# Patient Record
Sex: FEMALE | Race: WHITE | Employment: OTHER | URBAN - METROPOLITAN AREA
[De-identification: names, ages, dates, MRNs, and addresses within clinical notes are randomized per-mention and may not be internally consistent; named-entity substitution may affect disease eponyms.]

---

## 2017-02-02 ENCOUNTER — OFFICE VISIT (OUTPATIENT)
Dept: RHEUMATOLOGY | Facility: CLINIC | Age: 77
End: 2017-02-02

## 2017-02-02 VITALS
RESPIRATION RATE: 16 BRPM | HEART RATE: 60 BPM | BODY MASS INDEX: 52.63 KG/M2 | SYSTOLIC BLOOD PRESSURE: 128 MMHG | HEIGHT: 62 IN | DIASTOLIC BLOOD PRESSURE: 72 MMHG | WEIGHT: 286 LBS

## 2017-02-02 DIAGNOSIS — M05.79 SEROPOSITIVE RHEUMATOID ARTHRITIS OF MULTIPLE SITES (HCC): Primary | ICD-10-CM

## 2017-02-02 DIAGNOSIS — K21.9 GASTROESOPHAGEAL REFLUX DISEASE WITHOUT ESOPHAGITIS: ICD-10-CM

## 2017-02-02 PROCEDURE — 99213 OFFICE O/P EST LOW 20 MIN: CPT | Performed by: INTERNAL MEDICINE

## 2017-02-02 RX ORDER — HYDROCODONE BITARTRATE AND ACETAMINOPHEN 5; 325 MG/1; MG/1
2 TABLET ORAL EVERY 8 HOURS PRN
Qty: 180 TABLET | Refills: 0 | Status: SHIPPED | OUTPATIENT
Start: 2017-03-06 | End: 2017-02-02

## 2017-02-02 RX ORDER — HYDROCODONE BITARTRATE AND ACETAMINOPHEN 5; 325 MG/1; MG/1
2 TABLET ORAL EVERY 8 HOURS PRN
Qty: 180 TABLET | Refills: 0 | Status: SHIPPED | OUTPATIENT
Start: 2017-02-07 | End: 2017-02-02

## 2017-02-02 RX ORDER — HYDROCODONE BITARTRATE AND ACETAMINOPHEN 5; 325 MG/1; MG/1
2 TABLET ORAL EVERY 8 HOURS PRN
Qty: 180 TABLET | Refills: 0 | Status: SHIPPED | OUTPATIENT
Start: 2017-04-05 | End: 2017-05-05

## 2017-02-02 RX ORDER — MELOXICAM 15 MG/1
15 TABLET ORAL DAILY
Qty: 90 TABLET | Refills: 3 | Status: SHIPPED | OUTPATIENT
Start: 2017-02-02 | End: 2017-04-24

## 2017-02-02 RX ORDER — DIAZEPAM 5 MG/1
5 TABLET ORAL
Qty: 100 TABLET | Refills: 1 | Status: SHIPPED | OUTPATIENT
Start: 2017-02-02 | End: 2017-04-24

## 2017-02-02 NOTE — PATIENT INSTRUCTIONS
Current plan is to remain on methotrexate 5 tablets per week to treat the rheumatoid arthritis. Also continue folic acid 1 mg per day. Stop the etodolac. In its place take meloxicam 15 mg a day. For pain take Norco 2 tablets 3 times per day.   Try to wa

## 2017-02-02 NOTE — PROGRESS NOTES
[facility]  Dr. Marin Aparicio Progress Note     Subjective:   Cliff Taylor is a(n) 78year old female. Current complaints: Patient presents with:  Joint Pain: 3 month f/u. LABS 1-11-16 sed rate 36, RF-255.  New symptom-left 2 digits with trigger finger/ low-calorie diet and lose weight. Use diazepam 5 mg 1 every 6 hours for muscle cramps and stress. Also use Nexium daily for stomach upset. Return to see Dr. Mercy Espana in 3 months.        Alexandre Etienne MD 9/5/3652 5:23 PM

## 2017-04-12 ENCOUNTER — TELEPHONE (OUTPATIENT)
Dept: RHEUMATOLOGY | Facility: CLINIC | Age: 77
End: 2017-04-12

## 2017-04-12 NOTE — TELEPHONE ENCOUNTER
Pt stopped by office- does not have appt until 5/11/17.  Pt states 'was told by Dr. Marin Aparicio to  norco script because will out of the office the week is due norco refill at last office visit.' Pt's daughter called the office multiple times yesterday an

## 2017-04-24 ENCOUNTER — OFFICE VISIT (OUTPATIENT)
Dept: RHEUMATOLOGY | Facility: CLINIC | Age: 77
End: 2017-04-24

## 2017-04-24 VITALS — DIASTOLIC BLOOD PRESSURE: 78 MMHG | RESPIRATION RATE: 16 BRPM | SYSTOLIC BLOOD PRESSURE: 120 MMHG | HEART RATE: 76 BPM

## 2017-04-24 DIAGNOSIS — M15.9 PRIMARY OSTEOARTHRITIS INVOLVING MULTIPLE JOINTS: ICD-10-CM

## 2017-04-24 DIAGNOSIS — M05.79 SEROPOSITIVE RHEUMATOID ARTHRITIS OF MULTIPLE SITES (HCC): Primary | ICD-10-CM

## 2017-04-24 DIAGNOSIS — E66.01 MORBID OBESITY DUE TO EXCESS CALORIES (HCC): ICD-10-CM

## 2017-04-24 DIAGNOSIS — M47.816 PRIMARY OSTEOARTHRITIS OF LUMBAR SPINE: ICD-10-CM

## 2017-04-24 PROCEDURE — 99213 OFFICE O/P EST LOW 20 MIN: CPT | Performed by: INTERNAL MEDICINE

## 2017-04-24 RX ORDER — HYDROCODONE BITARTRATE AND ACETAMINOPHEN 5; 325 MG/1; MG/1
2 TABLET ORAL EVERY 8 HOURS PRN
COMMUNITY
End: 2017-04-24

## 2017-04-24 RX ORDER — HYDROCODONE BITARTRATE AND ACETAMINOPHEN 5; 325 MG/1; MG/1
TABLET ORAL
Qty: 180 TABLET | Refills: 0 | Status: SHIPPED | OUTPATIENT
Start: 2017-05-05 | End: 2017-10-23

## 2017-04-24 RX ORDER — DIAZEPAM 5 MG/1
5 TABLET ORAL
Qty: 100 TABLET | Refills: 2 | Status: SHIPPED | OUTPATIENT
Start: 2017-04-24 | End: 2017-07-24

## 2017-04-24 RX ORDER — HYDROCODONE BITARTRATE AND ACETAMINOPHEN 5; 325 MG/1; MG/1
TABLET ORAL
Qty: 180 TABLET | Refills: 0 | Status: SHIPPED | OUTPATIENT
Start: 2017-07-05 | End: 2017-10-23

## 2017-04-24 RX ORDER — HYDROXYCHLOROQUINE SULFATE 200 MG/1
200 TABLET, FILM COATED ORAL 2 TIMES DAILY
Qty: 180 TABLET | Refills: 1 | Status: SHIPPED | OUTPATIENT
Start: 2017-04-24 | End: 2017-07-24

## 2017-04-24 RX ORDER — MELOXICAM 15 MG/1
15 TABLET ORAL DAILY
Qty: 90 TABLET | Refills: 3 | Status: SHIPPED | OUTPATIENT
Start: 2017-04-24 | End: 2018-04-23 | Stop reason: ALTCHOICE

## 2017-04-24 RX ORDER — HYDROCODONE BITARTRATE AND ACETAMINOPHEN 5; 325 MG/1; MG/1
TABLET ORAL
Qty: 180 TABLET | Refills: 0 | Status: SHIPPED | OUTPATIENT
Start: 2017-06-05 | End: 2017-10-23

## 2017-04-24 RX ORDER — CEPHRADINE 500 MG
1 CAPSULE ORAL DAILY
COMMUNITY

## 2017-04-24 NOTE — PATIENT INSTRUCTIONS
Plan remain on methotrexate 5 tablets per week, along with folic acid 1 mg per day, along with meloxicam once a day, and Plaquenil 2 a day for treatment of rheumatoid arthritis. For pain take Norco 5 mg  , 2 tablets 3 times per day if needed.   For muscle

## 2017-04-24 NOTE — PROGRESS NOTES
[facility]  Dr. Bean Every Progress Note     Subjective:   Severa Curt is a(n) 78year old female. Current complaints: Patient presents with:  Rheumatoid Arthritis: LOV 2/2/17-here to get norco scripts-next script due 5/4/17.   Pt does not think casillas per day if needed. For muscle cramps take diazepam 5 mg 1 every 6-8 hours as needed. Use Nexium 1 a day for stomach upset. Take a vitamin D supplement because recent testing shows vitamin D is low at 16.8.   Return to see Dr. Jannie Phillips in 3 months with repe

## 2017-07-20 ENCOUNTER — TELEPHONE (OUTPATIENT)
Dept: RHEUMATOLOGY | Facility: CLINIC | Age: 77
End: 2017-07-20

## 2017-07-20 NOTE — TELEPHONE ENCOUNTER
Pt has appt 7/24/17- blood work has not been done. LMTCB.  mp       Future Appointments  Date Time Provider Savi Adames   0/20/4457 96:20 PM Geovanna Sims MD [facility] Laila Londono

## 2017-07-24 ENCOUNTER — OFFICE VISIT (OUTPATIENT)
Dept: RHEUMATOLOGY | Facility: CLINIC | Age: 77
End: 2017-07-24

## 2017-07-24 VITALS — RESPIRATION RATE: 20 BRPM | HEART RATE: 68 BPM | SYSTOLIC BLOOD PRESSURE: 136 MMHG | DIASTOLIC BLOOD PRESSURE: 86 MMHG

## 2017-07-24 DIAGNOSIS — M47.816 PRIMARY OSTEOARTHRITIS OF LUMBAR SPINE: ICD-10-CM

## 2017-07-24 DIAGNOSIS — M06.09 SERONEGATIVE RHEUMATOID ARTHRITIS OF MULTIPLE SITES (HCC): Primary | ICD-10-CM

## 2017-07-24 DIAGNOSIS — M15.9 PRIMARY OSTEOARTHRITIS INVOLVING MULTIPLE JOINTS: ICD-10-CM

## 2017-07-24 DIAGNOSIS — M79.604 PAIN IN RIGHT LEG: ICD-10-CM

## 2017-07-24 DIAGNOSIS — M05.79 SEROPOSITIVE RHEUMATOID ARTHRITIS OF MULTIPLE SITES (HCC): ICD-10-CM

## 2017-07-24 DIAGNOSIS — E66.09 CLASS 1 OBESITY DUE TO EXCESS CALORIES WITH SERIOUS COMORBIDITY IN ADULT, UNSPECIFIED BMI: ICD-10-CM

## 2017-07-24 PROCEDURE — 99213 OFFICE O/P EST LOW 20 MIN: CPT | Performed by: INTERNAL MEDICINE

## 2017-07-24 RX ORDER — HYDROCODONE BITARTRATE AND ACETAMINOPHEN 5; 325 MG/1; MG/1
2 TABLET ORAL EVERY 6 HOURS PRN
Qty: 240 TABLET | Refills: 0 | Status: SHIPPED | OUTPATIENT
Start: 2017-08-23 | End: 2017-09-22

## 2017-07-24 RX ORDER — FOLIC ACID 1 MG/1
1 TABLET ORAL DAILY
Qty: 90 TABLET | Refills: 5 | Status: SHIPPED | OUTPATIENT
Start: 2017-07-24 | End: 2019-01-21

## 2017-07-24 RX ORDER — HYDROCODONE BITARTRATE AND ACETAMINOPHEN 5; 325 MG/1; MG/1
TABLET ORAL
Qty: 240 TABLET | Refills: 0 | Status: SHIPPED | OUTPATIENT
Start: 2017-07-24 | End: 2018-01-23

## 2017-07-24 RX ORDER — DIAZEPAM 5 MG/1
5 TABLET ORAL
Qty: 100 TABLET | Refills: 2 | Status: SHIPPED | OUTPATIENT
Start: 2017-07-24 | End: 2017-10-23

## 2017-07-24 RX ORDER — HYDROCODONE BITARTRATE AND ACETAMINOPHEN 5; 325 MG/1; MG/1
2 TABLET ORAL EVERY 6 HOURS PRN
Qty: 240 TABLET | Refills: 0 | Status: SHIPPED | OUTPATIENT
Start: 2017-09-22 | End: 2017-10-22

## 2017-07-24 RX ORDER — FLUOXETINE HYDROCHLORIDE 20 MG/1
20 CAPSULE ORAL DAILY
Qty: 90 CAPSULE | Refills: 1 | Status: SHIPPED | OUTPATIENT
Start: 2017-07-24 | End: 2017-10-23

## 2017-07-24 NOTE — PROGRESS NOTES
[facility]  Dr. Aneesh Carr Progress Note     Subjective:   Manish Patterson is a(n) 78year old female. Current complaints: Patient presents with:  Osteoarthritis: 3 month f/u. Blood work not done. Pt states 'is confused about blood work.  Having lots of

## 2017-07-24 NOTE — TELEPHONE ENCOUNTER
Pt returned call.  Pt states 'doesn't remember that needs blood work done.' Pt to discuss with MD. Carole Munoz

## 2017-07-24 NOTE — PATIENT INSTRUCTIONS
Rheumatoid arthritis remain on methotrexate 5 tablets per week, along with folic acid 1 mg a day, also Plaquenil 2 tablets daily and meloxicam once a day. For pain take Norco 5 mg tablets 2 tablets up to 4 times a day. No more than 8 a day.

## 2017-10-23 ENCOUNTER — OFFICE VISIT (OUTPATIENT)
Dept: RHEUMATOLOGY | Facility: CLINIC | Age: 77
End: 2017-10-23

## 2017-10-23 VITALS
DIASTOLIC BLOOD PRESSURE: 74 MMHG | BODY MASS INDEX: 52 KG/M2 | SYSTOLIC BLOOD PRESSURE: 122 MMHG | RESPIRATION RATE: 24 BRPM | WEIGHT: 284 LBS | HEART RATE: 72 BPM

## 2017-10-23 DIAGNOSIS — M47.816 PRIMARY OSTEOARTHRITIS OF LUMBAR SPINE: ICD-10-CM

## 2017-10-23 DIAGNOSIS — M05.79 SEROPOSITIVE RHEUMATOID ARTHRITIS OF MULTIPLE SITES (HCC): Primary | ICD-10-CM

## 2017-10-23 DIAGNOSIS — K21.9 GASTROESOPHAGEAL REFLUX DISEASE WITHOUT ESOPHAGITIS: ICD-10-CM

## 2017-10-23 DIAGNOSIS — M15.9 PRIMARY OSTEOARTHRITIS INVOLVING MULTIPLE JOINTS: ICD-10-CM

## 2017-10-23 DIAGNOSIS — IMO0001 CLASS 3 OBESITY DUE TO EXCESS CALORIES WITHOUT SERIOUS COMORBIDITY WITH BODY MASS INDEX (BMI) OF 50.0 TO 59.9 IN ADULT: ICD-10-CM

## 2017-10-23 DIAGNOSIS — E55.9 VITAMIN D DEFICIENCY: ICD-10-CM

## 2017-10-23 PROCEDURE — 99213 OFFICE O/P EST LOW 20 MIN: CPT | Performed by: INTERNAL MEDICINE

## 2017-10-23 RX ORDER — HYDROCODONE BITARTRATE AND ACETAMINOPHEN 5; 325 MG/1; MG/1
TABLET ORAL
Qty: 180 TABLET | Refills: 0 | Status: SHIPPED | OUTPATIENT
Start: 2017-11-22 | End: 2018-01-23

## 2017-10-23 RX ORDER — RANITIDINE 150 MG/1
150 TABLET ORAL
COMMUNITY
Start: 2017-09-29 | End: 2019-04-19 | Stop reason: ALTCHOICE

## 2017-10-23 RX ORDER — HYDROCODONE BITARTRATE AND ACETAMINOPHEN 5; 325 MG/1; MG/1
TABLET ORAL
Qty: 180 TABLET | Refills: 0 | Status: SHIPPED | OUTPATIENT
Start: 2017-10-23 | End: 2018-01-23

## 2017-10-23 RX ORDER — FLUOXETINE HYDROCHLORIDE 20 MG/1
20 CAPSULE ORAL DAILY
Qty: 90 CAPSULE | Refills: 1 | Status: SHIPPED | OUTPATIENT
Start: 2017-10-23 | End: 2018-03-30

## 2017-10-23 RX ORDER — HYDROCODONE BITARTRATE AND ACETAMINOPHEN 5; 325 MG/1; MG/1
TABLET ORAL
Qty: 180 TABLET | Refills: 0 | Status: SHIPPED | OUTPATIENT
Start: 2017-12-22 | End: 2018-01-23

## 2017-10-23 RX ORDER — DIAZEPAM 5 MG/1
5 TABLET ORAL
Qty: 100 TABLET | Refills: 2 | Status: SHIPPED | OUTPATIENT
Start: 2017-10-23 | End: 2018-01-23

## 2017-10-23 RX ORDER — ERGOCALCIFEROL 1.25 MG/1
50000 CAPSULE ORAL WEEKLY
Qty: 12 CAPSULE | Refills: 3 | Status: SHIPPED | OUTPATIENT
Start: 2017-10-23 | End: 2018-07-20

## 2017-10-23 NOTE — PROGRESS NOTES
[facility]  Dr. Dangelo Abbott Progress Note     Subjective:   Stephanie Hernandez is a(n) 78year old female. Current complaints: Patient presents with:  Rheumatoid Arthritis: 3 month f/u. 9/16/17 labs ESR 35.  Pt states 'is feeling stressed out right now.'  Re

## 2017-10-23 NOTE — PATIENT INSTRUCTIONS
Continue Norco for pain 2 tablets three times per day 5 mg. Methotrexate 2.5 mg 5 per weeks. Use Folic acid 1 mg per day. Use Plaquenil 2 tablets per day. Instructed to lose weight. Return to office in 3 months.

## 2017-10-30 ENCOUNTER — TELEPHONE (OUTPATIENT)
Dept: RHEUMATOLOGY | Facility: CLINIC | Age: 77
End: 2017-10-30

## 2017-10-30 NOTE — TELEPHONE ENCOUNTER
Rec'd refill request from [place] for Nexium 40 mg cap, once daily, qty 30. Med sent.  mp     LOV 10/23/17  Last refill 10/3/17  Future Appointments  Date Time Provider Savi Adames   3/47/9885 4:05 PM Isaias Taveras MD [facility] Richmond Watt

## 2017-11-01 ENCOUNTER — TELEPHONE (OUTPATIENT)
Dept: RHEUMATOLOGY | Facility: CLINIC | Age: 77
End: 2017-11-01

## 2017-11-01 NOTE — TELEPHONE ENCOUNTER
[facility] SEAN CALLED. I SPOKE TO GIAN CHIU.  I STARTED PA FOR PT'S ETODOLAC   PA APPROVED #[id number]  INFORMED PHARMACY.   ANTHONY INFORMED PT IN [facility]

## 2017-11-01 NOTE — TELEPHONE ENCOUNTER
[facility] SEAN CALLED. I SPOKE TO GIAN CHIU.  I STARTED PA FOR PT'S NEXIUM FORMULARY EXCEPTION FOR BRAND NAME. PA APPROVED #[id number]  AWAITING APPROVAL LETTER TO BE FAXED. INFORMED PHARMACY.   ANTHONY INFORMED PT IN [facility]

## 2017-11-08 ENCOUNTER — TELEPHONE (OUTPATIENT)
Dept: RHEUMATOLOGY | Facility: CLINIC | Age: 77
End: 2017-11-08

## 2017-11-08 NOTE — TELEPHONE ENCOUNTER
Rec'd refill request from [place] for diclofenac gel. Med sent.  mp    Future Appointments  Date Time Provider Savi Adames   0/69/0122 2:76 PM Dante Freire MD [facility]

## 2018-01-09 RX ORDER — ETODOLAC 500 MG/1
TABLET, FILM COATED ORAL
Qty: 180 TABLET | Refills: 2 | OUTPATIENT
Start: 2018-01-09

## 2018-01-23 ENCOUNTER — OFFICE VISIT (OUTPATIENT)
Dept: RHEUMATOLOGY | Facility: CLINIC | Age: 78
End: 2018-01-23

## 2018-01-23 VITALS
HEART RATE: 80 BPM | WEIGHT: 280 LBS | SYSTOLIC BLOOD PRESSURE: 124 MMHG | RESPIRATION RATE: 24 BRPM | BODY MASS INDEX: 51 KG/M2 | DIASTOLIC BLOOD PRESSURE: 82 MMHG

## 2018-01-23 DIAGNOSIS — M15.9 PRIMARY OSTEOARTHRITIS INVOLVING MULTIPLE JOINTS: ICD-10-CM

## 2018-01-23 DIAGNOSIS — M47.816 PRIMARY OSTEOARTHRITIS OF LUMBAR SPINE: ICD-10-CM

## 2018-01-23 DIAGNOSIS — Z98.1 S/P LUMBAR FUSION: ICD-10-CM

## 2018-01-23 DIAGNOSIS — E66.01 CLASS 3 SEVERE OBESITY DUE TO EXCESS CALORIES WITH SERIOUS COMORBIDITY AND BODY MASS INDEX (BMI) OF 50.0 TO 59.9 IN ADULT (HCC): ICD-10-CM

## 2018-01-23 DIAGNOSIS — M05.79 SEROPOSITIVE RHEUMATOID ARTHRITIS OF MULTIPLE SITES (HCC): Primary | ICD-10-CM

## 2018-01-23 PROCEDURE — 99213 OFFICE O/P EST LOW 20 MIN: CPT | Performed by: INTERNAL MEDICINE

## 2018-01-23 RX ORDER — HYDROCODONE BITARTRATE AND ACETAMINOPHEN 5; 325 MG/1; MG/1
TABLET ORAL
Qty: 180 TABLET | Refills: 0 | Status: SHIPPED | OUTPATIENT
Start: 2018-01-23 | End: 2018-04-23

## 2018-01-23 RX ORDER — DIAZEPAM 5 MG/1
5 TABLET ORAL
Qty: 100 TABLET | Refills: 2 | Status: SHIPPED | OUTPATIENT
Start: 2018-01-23 | End: 2018-04-23

## 2018-01-23 RX ORDER — HYDROCODONE BITARTRATE AND ACETAMINOPHEN 5; 325 MG/1; MG/1
TABLET ORAL
Qty: 180 TABLET | Refills: 0 | Status: SHIPPED | OUTPATIENT
Start: 2018-03-24 | End: 2018-04-23

## 2018-01-23 RX ORDER — HYDROCODONE BITARTRATE AND ACETAMINOPHEN 5; 325 MG/1; MG/1
TABLET ORAL
Qty: 180 TABLET | Refills: 0 | Status: SHIPPED | OUTPATIENT
Start: 2018-02-23 | End: 2018-04-23

## 2018-01-23 NOTE — PROGRESS NOTES
[facility]  Dr. Brunilda Morse Progress Note     Subjective:   Yoly Thompson is a(n) 78year old female.    Current complaints: Patient presents with:  Osteoarthritis: 3 month f/u. 1/22/18 labs ESR 73. Pt states 'has stopped methotrexate because is on Eliqui

## 2018-01-23 NOTE — PATIENT INSTRUCTIONS
For chronic back pain use Norco 10 mg, 2 tablets 3 times a day. Use Diazepam 5 mg three times a day as a muscle relaxant. Recent episode of syncope. Off Methotrexate. Use Eliquis daily as per cardiology. Use your CPAP machine as ordered.   If back viridiana

## 2018-03-23 RX ORDER — DIAZEPAM 5 MG/1
TABLET ORAL
Qty: 100 TABLET | Refills: 1 | OUTPATIENT
Start: 2018-03-23

## 2018-03-23 RX ORDER — ESOMEPRAZOLE MAGNESIUM 40 MG/1
40 CAPSULE, DELAYED RELEASE ORAL
Qty: 30 CAPSULE | Refills: 1 | Status: SHIPPED | OUTPATIENT
Start: 2018-03-23 | End: 2018-05-22

## 2018-03-29 ENCOUNTER — TELEPHONE (OUTPATIENT)
Dept: RHEUMATOLOGY | Facility: CLINIC | Age: 78
End: 2018-03-29

## 2018-03-29 NOTE — TELEPHONE ENCOUNTER
Pt called requesting a refill for voltaren gel.  mp    Future Appointments  Date Time Provider Savi Adames   5/83/7163 5:47 PM Bel Lewis MD [facility]

## 2018-04-02 RX ORDER — FLUOXETINE HYDROCHLORIDE 20 MG/1
CAPSULE ORAL
Qty: 90 CAPSULE | Refills: 0 | Status: SHIPPED | OUTPATIENT
Start: 2018-04-02 | End: 2021-11-30

## 2018-04-02 RX ORDER — DIAZEPAM 5 MG/1
TABLET ORAL
Qty: 100 TABLET | Refills: 1 | OUTPATIENT
Start: 2018-04-02

## 2018-04-02 NOTE — TELEPHONE ENCOUNTER
Future Appointments  Date Time Provider Savi Adames   4/05/4684 2:57 PM Burak Marquez MD [facility] Luisito Mendez

## 2018-04-03 RX ORDER — DIAZEPAM 5 MG/1
TABLET ORAL
Qty: 100 TABLET | Refills: 1 | OUTPATIENT
Start: 2018-04-03

## 2018-04-23 ENCOUNTER — OFFICE VISIT (OUTPATIENT)
Dept: RHEUMATOLOGY | Facility: CLINIC | Age: 78
End: 2018-04-23

## 2018-04-23 VITALS — SYSTOLIC BLOOD PRESSURE: 136 MMHG | DIASTOLIC BLOOD PRESSURE: 86 MMHG | HEART RATE: 80 BPM | RESPIRATION RATE: 20 BRPM

## 2018-04-23 DIAGNOSIS — M19.011 ARTHRITIS OF RIGHT SHOULDER REGION: ICD-10-CM

## 2018-04-23 DIAGNOSIS — M05.79 SEROPOSITIVE RHEUMATOID ARTHRITIS OF MULTIPLE SITES (HCC): ICD-10-CM

## 2018-04-23 DIAGNOSIS — M47.816 PRIMARY OSTEOARTHRITIS OF LUMBAR SPINE: Primary | ICD-10-CM

## 2018-04-23 DIAGNOSIS — M25.511 TRIGGER POINT OF RIGHT SHOULDER REGION: ICD-10-CM

## 2018-04-23 DIAGNOSIS — M25.511 TRIGGER POINT OF SHOULDER REGION, RIGHT: ICD-10-CM

## 2018-04-23 DIAGNOSIS — M15.9 PRIMARY OSTEOARTHRITIS INVOLVING MULTIPLE JOINTS: ICD-10-CM

## 2018-04-23 DIAGNOSIS — E66.01 CLASS 3 SEVERE OBESITY DUE TO EXCESS CALORIES WITH SERIOUS COMORBIDITY AND BODY MASS INDEX (BMI) OF 50.0 TO 59.9 IN ADULT (HCC): ICD-10-CM

## 2018-04-23 DIAGNOSIS — Z98.1 S/P LUMBAR FUSION: ICD-10-CM

## 2018-04-23 PROCEDURE — 20552 NJX 1/MLT TRIGGER POINT 1/2: CPT | Performed by: INTERNAL MEDICINE

## 2018-04-23 PROCEDURE — 99213 OFFICE O/P EST LOW 20 MIN: CPT | Performed by: INTERNAL MEDICINE

## 2018-04-23 PROCEDURE — 20610 DRAIN/INJ JOINT/BURSA W/O US: CPT | Performed by: INTERNAL MEDICINE

## 2018-04-23 RX ORDER — HYDROCODONE BITARTRATE AND ACETAMINOPHEN 5; 325 MG/1; MG/1
TABLET ORAL
Qty: 180 TABLET | Refills: 0 | Status: SHIPPED | OUTPATIENT
Start: 2018-05-22 | End: 2018-07-20

## 2018-04-23 RX ORDER — GABAPENTIN 300 MG/1
300 CAPSULE ORAL 2 TIMES DAILY PRN
Qty: 60 CAPSULE | Refills: 5 | Status: SHIPPED | OUTPATIENT
Start: 2018-04-23 | End: 2019-01-21

## 2018-04-23 RX ORDER — GABAPENTIN 300 MG/1
300 CAPSULE ORAL 2 TIMES DAILY PRN
Qty: 60 CAPSULE | Refills: 5 | Status: SHIPPED | OUTPATIENT
Start: 2018-04-23 | End: 2018-04-23

## 2018-04-23 RX ORDER — HYDROCODONE BITARTRATE AND ACETAMINOPHEN 5; 325 MG/1; MG/1
TABLET ORAL
Qty: 180 TABLET | Refills: 0 | Status: SHIPPED | OUTPATIENT
Start: 2018-06-21 | End: 2018-07-20

## 2018-04-23 RX ORDER — METHYLPREDNISOLONE ACETATE 40 MG/ML
40 INJECTION, SUSPENSION INTRA-ARTICULAR; INTRALESIONAL; INTRAMUSCULAR; SOFT TISSUE ONCE
Status: COMPLETED | OUTPATIENT
Start: 2018-04-23 | End: 2018-04-23

## 2018-04-23 RX ORDER — HYDROCODONE BITARTRATE AND ACETAMINOPHEN 5; 325 MG/1; MG/1
TABLET ORAL
Qty: 180 TABLET | Refills: 0 | Status: SHIPPED | OUTPATIENT
Start: 2018-04-23 | End: 2018-07-20

## 2018-04-23 RX ORDER — DIAZEPAM 5 MG/1
5 TABLET ORAL
Qty: 100 TABLET | Refills: 2 | Status: SHIPPED | OUTPATIENT
Start: 2018-04-23 | End: 2018-07-20

## 2018-04-23 RX ADMIN — METHYLPREDNISOLONE ACETATE 40 MG: 40 INJECTION, SUSPENSION INTRA-ARTICULAR; INTRALESIONAL; INTRAMUSCULAR; SOFT TISSUE at 18:10:00

## 2018-04-23 NOTE — PATIENT INSTRUCTIONS
Today the right shoulder was injected with cortisone and some lidocaine go home and rest.  Put ice on the shoulder with biology. For pain take Norco 10 mg 2 tablets 3 times a day. For gabapentin take 300 mg twice a day.   Use diazepam 5 mg as needed for

## 2018-04-23 NOTE — PROGRESS NOTES
[facility]  Dr. Jodi Victor Progress Note     Subjective:   Curvin Bence is a(n) 78year old female. Current complaints: Patient presents with:  Rheumatoid Arthritis: 3 month f/u. Pt states 'has been in a lot of pain. Has been doing home PT.  Was diagn

## 2018-04-23 NOTE — PROCEDURES
After obtaining consent from the patient, the right shoulder was cleaned both in the front anteriorly and in the back. Betadine and alcohol wipes were used.   Then the right shoulder joint was anteriorly injected with 40 mg of methylprednisolone and 1 cc o

## 2018-04-24 RX ORDER — ETODOLAC 500 MG/1
TABLET, FILM COATED ORAL
Qty: 180 TABLET | Refills: 1 | OUTPATIENT
Start: 2018-04-24

## 2018-04-27 RX ORDER — ETODOLAC 500 MG/1
TABLET, FILM COATED ORAL
Qty: 180 TABLET | Refills: 2 | OUTPATIENT
Start: 2018-04-27

## 2018-04-30 ENCOUNTER — TELEPHONE (OUTPATIENT)
Dept: RHEUMATOLOGY | Facility: CLINIC | Age: 78
End: 2018-04-30

## 2018-04-30 NOTE — TELEPHONE ENCOUNTER
Daughter called, wants to know if mom can go on any other medication for RA-methotrexate and meloxicam stopped since pt is on Eliques - daughter stated that her RA is worse since off.   Informed daughter would inform Dr. Nick Maloney is out of town until [facility]

## 2018-05-07 NOTE — TELEPHONE ENCOUNTER
Michelet Gonsalez was called. Mrs. Luciano Mendoza recently has been placed on Eliquis a blood thinner. She was told by her cardiologist that she had a stop her arthritis medicine. Therefore she stopped both meloxicam and methotrexate.   I called her today and told her yes

## 2018-05-07 NOTE — TELEPHONE ENCOUNTER
Spoke with pt. Pt would like clarification on medications. Pt states 'stopped methotrexate in January because is on Eliquis. Can not take methotrexate, meloxicam or etodolac.  Would like to know if can take anything else?'  Pt would like daughter to be call

## 2018-05-22 ENCOUNTER — TELEPHONE (OUTPATIENT)
Dept: RHEUMATOLOGY | Facility: CLINIC | Age: 78
End: 2018-05-22

## 2018-05-22 RX ORDER — ESOMEPRAZOLE MAGNESIUM 40 MG/1
40 CAPSULE, DELAYED RELEASE ORAL
Qty: 30 CAPSULE | Refills: 1 | Status: SHIPPED | OUTPATIENT
Start: 2018-05-22 | End: 2018-07-20

## 2018-05-22 NOTE — TELEPHONE ENCOUNTER
Rec'd refill request from [place] for nexium. Med sent.  mp    Future Appointments  Date Time Provider Savi Littlejohni   8/09/6993 7:17 PM Faisal Sanchez MD [facility] GILBERTO Winkler

## 2018-06-12 ENCOUNTER — TELEPHONE (OUTPATIENT)
Dept: RHEUMATOLOGY | Facility: CLINIC | Age: 78
End: 2018-06-12

## 2018-07-20 ENCOUNTER — OFFICE VISIT (OUTPATIENT)
Dept: RHEUMATOLOGY | Facility: CLINIC | Age: 78
End: 2018-07-20
Payer: MEDICARE

## 2018-07-20 VITALS
HEART RATE: 80 BPM | WEIGHT: 263 LBS | HEIGHT: 62 IN | RESPIRATION RATE: 20 BRPM | SYSTOLIC BLOOD PRESSURE: 130 MMHG | DIASTOLIC BLOOD PRESSURE: 80 MMHG | TEMPERATURE: 98 F | BODY MASS INDEX: 48.4 KG/M2

## 2018-07-20 DIAGNOSIS — E66.01 CLASS 3 SEVERE OBESITY DUE TO EXCESS CALORIES WITH SERIOUS COMORBIDITY AND BODY MASS INDEX (BMI) OF 50.0 TO 59.9 IN ADULT (HCC): ICD-10-CM

## 2018-07-20 DIAGNOSIS — M15.9 PRIMARY OSTEOARTHRITIS INVOLVING MULTIPLE JOINTS: ICD-10-CM

## 2018-07-20 DIAGNOSIS — M47.816 PRIMARY OSTEOARTHRITIS OF LUMBAR SPINE: ICD-10-CM

## 2018-07-20 DIAGNOSIS — M05.79 SEROPOSITIVE RHEUMATOID ARTHRITIS OF MULTIPLE SITES (HCC): Primary | ICD-10-CM

## 2018-07-20 DIAGNOSIS — Z98.1 S/P LUMBAR FUSION: ICD-10-CM

## 2018-07-20 PROCEDURE — 99213 OFFICE O/P EST LOW 20 MIN: CPT | Performed by: INTERNAL MEDICINE

## 2018-07-20 RX ORDER — ESOMEPRAZOLE MAGNESIUM 40 MG/1
40 CAPSULE, DELAYED RELEASE ORAL
Qty: 30 CAPSULE | Refills: 1 | Status: SHIPPED | OUTPATIENT
Start: 2018-07-20 | End: 2019-01-21

## 2018-07-20 RX ORDER — HYDROCODONE BITATRATE AND ACETAMINOPHEN 5; 325 MG/1; MG/1
TABLET ORAL
Qty: 180 TABLET | Refills: 0 | Status: SHIPPED | OUTPATIENT
Start: 2018-07-20 | End: 2018-07-20

## 2018-07-20 RX ORDER — HYDROCODONE BITATRATE AND ACETAMINOPHEN 5; 325 MG/1; MG/1
TABLET ORAL
Qty: 180 TABLET | Refills: 0 | Status: SHIPPED | OUTPATIENT
Start: 2018-07-20 | End: 2019-01-17

## 2018-07-20 RX ORDER — HYDROCODONE BITATRATE AND ACETAMINOPHEN 5; 325 MG/1; MG/1
TABLET ORAL
Qty: 180 TABLET | Refills: 0 | Status: SHIPPED | OUTPATIENT
Start: 2018-08-19 | End: 2018-07-20

## 2018-07-20 RX ORDER — ERGOCALCIFEROL 1.25 MG/1
50000 CAPSULE ORAL WEEKLY
Qty: 12 CAPSULE | Refills: 3 | Status: SHIPPED | OUTPATIENT
Start: 2018-07-20 | End: 2020-01-17

## 2018-07-20 RX ORDER — DIAZEPAM 5 MG/1
5 TABLET ORAL
Qty: 100 TABLET | Refills: 2 | Status: SHIPPED | OUTPATIENT
Start: 2018-07-20 | End: 2018-10-17

## 2018-07-20 RX ORDER — HYDROCODONE BITATRATE AND ACETAMINOPHEN 5; 325 MG/1; MG/1
TABLET ORAL
Qty: 180 TABLET | Refills: 0 | Status: SHIPPED | OUTPATIENT
Start: 2018-09-20 | End: 2018-07-20 | Stop reason: CLARIF

## 2018-07-20 RX ORDER — HYDROCODONE BITATRATE AND ACETAMINOPHEN 5; 325 MG/1; MG/1
TABLET ORAL
Qty: 180 TABLET | Refills: 0 | Status: SHIPPED | OUTPATIENT
Start: 2018-09-18 | End: 2019-07-01 | Stop reason: ALTCHOICE

## 2018-07-20 RX ORDER — HYDROCODONE BITATRATE AND ACETAMINOPHEN 5; 325 MG/1; MG/1
TABLET ORAL
Qty: 180 TABLET | Refills: 0 | Status: SHIPPED | OUTPATIENT
Start: 2018-08-20 | End: 2018-07-20 | Stop reason: CLARIF

## 2018-07-20 NOTE — PATIENT INSTRUCTIONS
At present time use Norco 5 mg 2 tablets 3 times a day brand name. Insurance might not pay for brand name Ashanti Agustin, and you might have to use generic hydrocodone. Take diazepam 5 mg tablets as a muscle relaxer 3 times a day.   Discontinue methotrexate due to

## 2018-10-02 ENCOUNTER — TELEPHONE (OUTPATIENT)
Dept: RHEUMATOLOGY | Facility: CLINIC | Age: 78
End: 2018-10-02

## 2018-10-02 DIAGNOSIS — M06.9 RHEUMATOID ARTHRITIS, INVOLVING UNSPECIFIED SITE, UNSPECIFIED RHEUMATOID FACTOR PRESENCE: Primary | ICD-10-CM

## 2018-10-17 ENCOUNTER — OFFICE VISIT (OUTPATIENT)
Dept: RHEUMATOLOGY | Facility: CLINIC | Age: 78
End: 2018-10-17
Payer: MEDICARE

## 2018-10-17 VITALS
DIASTOLIC BLOOD PRESSURE: 70 MMHG | WEIGHT: 274 LBS | BODY MASS INDEX: 49.16 KG/M2 | HEIGHT: 62.5 IN | SYSTOLIC BLOOD PRESSURE: 118 MMHG

## 2018-10-17 DIAGNOSIS — M15.9 PRIMARY OSTEOARTHRITIS INVOLVING MULTIPLE JOINTS: ICD-10-CM

## 2018-10-17 DIAGNOSIS — M79.7 FIBROMYALGIA: ICD-10-CM

## 2018-10-17 DIAGNOSIS — Z98.1 S/P LUMBAR FUSION: ICD-10-CM

## 2018-10-17 DIAGNOSIS — M05.79 SEROPOSITIVE RHEUMATOID ARTHRITIS OF MULTIPLE SITES (HCC): Primary | ICD-10-CM

## 2018-10-17 DIAGNOSIS — M47.816 PRIMARY OSTEOARTHRITIS OF LUMBAR SPINE: ICD-10-CM

## 2018-10-17 PROCEDURE — 99213 OFFICE O/P EST LOW 20 MIN: CPT | Performed by: INTERNAL MEDICINE

## 2018-10-17 RX ORDER — HYDROCODONE BITARTRATE AND ACETAMINOPHEN 5; 325 MG/1; MG/1
2 TABLET ORAL 3 TIMES DAILY
Qty: 180 TABLET | Refills: 0 | Status: SHIPPED | OUTPATIENT
Start: 2018-12-16 | End: 2019-01-15

## 2018-10-17 RX ORDER — HYDROCODONE BITARTRATE AND ACETAMINOPHEN 5; 325 MG/1; MG/1
2 TABLET ORAL 3 TIMES DAILY
Qty: 180 TABLET | Refills: 0 | Status: SHIPPED | OUTPATIENT
Start: 2018-11-16 | End: 2018-12-16

## 2018-10-17 RX ORDER — DIAZEPAM 5 MG/1
5 TABLET ORAL
Qty: 100 TABLET | Refills: 2 | Status: SHIPPED | OUTPATIENT
Start: 2018-10-17 | End: 2019-01-17

## 2018-10-17 RX ORDER — HYDROCODONE BITARTRATE AND ACETAMINOPHEN 5; 325 MG/1; MG/1
2 TABLET ORAL 3 TIMES DAILY
Qty: 180 TABLET | Refills: 0 | Status: SHIPPED | OUTPATIENT
Start: 2018-10-17 | End: 2018-11-16

## 2018-10-17 RX ORDER — FOLIC ACID 1 MG/1
1 TABLET ORAL DAILY
Qty: 30 TABLET | Refills: 5 | Status: SHIPPED | OUTPATIENT
Start: 2018-10-17 | End: 2019-04-19

## 2018-10-17 RX ORDER — FUROSEMIDE 20 MG/1
40 TABLET ORAL DAILY
COMMUNITY
Start: 2018-07-17 | End: 2021-11-30 | Stop reason: ALTCHOICE

## 2018-10-17 NOTE — PROGRESS NOTES
[facility]  Dr. Calvin Knutson Progress Note     Subjective:   Daniella Urban is a(n) 78year old female.    Current complaints: Patient presents with:  Rheumatoid Arthritis: 3 month f/u, Lab work done at [facility] 10/2/2018, Sed Rate 44, (In Care Everywhere-callin Aleve.  You can use over-the-counter extra strength Tylenol 2 tablets twice a day also. Also apply Voltaren gel 4 times a day to the sore joints. Continue fluoxetine once a day. Return to see Dr. Jannie Phillips in 3 months with recheck of labs.           Quiana Abbott

## 2018-10-17 NOTE — PATIENT INSTRUCTIONS
Current plan is to restart methotrexate at 5 tablets/week to treat the rheumatoid arthritis. When taking methotrexate also take folic acid 1 mg daily.   For the muscle pain of fibromyalgia take diazepam also known as Valium 5 mg tablets 1 or 2 at a time 3

## 2018-10-18 ENCOUNTER — TELEPHONE (OUTPATIENT)
Dept: RHEUMATOLOGY | Facility: CLINIC | Age: 78
End: 2018-10-18

## 2018-10-18 NOTE — TELEPHONE ENCOUNTER
PA for hydrocodone 5mg done on CMM. Approved for non-formulary from 7/20/18-10-18-19. Pharmacy notified.

## 2019-01-17 RX ORDER — DIAZEPAM 5 MG/1
5 TABLET ORAL
Qty: 24 TABLET | Refills: 0 | Status: SHIPPED | OUTPATIENT
Start: 2019-01-17 | End: 2019-01-18

## 2019-01-17 RX ORDER — HYDROCODONE BITATRATE AND ACETAMINOPHEN 5; 325 MG/1; MG/1
2 TABLET ORAL EVERY 8 HOURS PRN
Qty: 24 TABLET | Refills: 0 | Status: SHIPPED | OUTPATIENT
Start: 2019-01-17 | End: 2019-01-18

## 2019-01-17 NOTE — TELEPHONE ENCOUNTER
Pt missed appt on 1/15/19, out of pain medication.  LOV 10/17/18  Future Appointments   Date Time Provider Savi Adaems   2/03/8599  2:40 PM Anabella Shannon MD [facility] Chan Pierson

## 2019-01-18 RX ORDER — HYDROCODONE BITATRATE AND ACETAMINOPHEN 5; 325 MG/1; MG/1
2 TABLET ORAL EVERY 8 HOURS PRN
Qty: 24 TABLET | Refills: 0 | Status: SHIPPED | OUTPATIENT
Start: 2019-01-18 | End: 2019-01-22

## 2019-01-18 RX ORDER — DIAZEPAM 5 MG/1
5 TABLET ORAL
Qty: 24 TABLET | Refills: 0 | Status: SHIPPED | OUTPATIENT
Start: 2019-01-18 | End: 2019-01-21

## 2019-01-21 ENCOUNTER — LAB ENCOUNTER (OUTPATIENT)
Dept: LAB | Age: 79
End: 2019-01-21
Attending: INTERNAL MEDICINE
Payer: MEDICARE

## 2019-01-21 ENCOUNTER — OFFICE VISIT (OUTPATIENT)
Dept: RHEUMATOLOGY | Facility: CLINIC | Age: 79
End: 2019-01-21
Payer: MEDICARE

## 2019-01-21 ENCOUNTER — TELEPHONE (OUTPATIENT)
Dept: RHEUMATOLOGY | Facility: CLINIC | Age: 79
End: 2019-01-21

## 2019-01-21 VITALS
SYSTOLIC BLOOD PRESSURE: 112 MMHG | HEIGHT: 62.5 IN | RESPIRATION RATE: 18 BRPM | WEIGHT: 274 LBS | HEART RATE: 76 BPM | BODY MASS INDEX: 49.16 KG/M2 | DIASTOLIC BLOOD PRESSURE: 70 MMHG

## 2019-01-21 DIAGNOSIS — M79.7 FIBROMYALGIA: ICD-10-CM

## 2019-01-21 DIAGNOSIS — M15.9 PRIMARY OSTEOARTHRITIS INVOLVING MULTIPLE JOINTS: ICD-10-CM

## 2019-01-21 DIAGNOSIS — Z98.1 S/P LUMBAR FUSION: ICD-10-CM

## 2019-01-21 DIAGNOSIS — M47.816 PRIMARY OSTEOARTHRITIS OF LUMBAR SPINE: ICD-10-CM

## 2019-01-21 DIAGNOSIS — E66.01 CLASS 3 SEVERE OBESITY DUE TO EXCESS CALORIES WITHOUT SERIOUS COMORBIDITY WITH BODY MASS INDEX (BMI) OF 60.0 TO 69.9 IN ADULT (HCC): ICD-10-CM

## 2019-01-21 DIAGNOSIS — M05.79 SEROPOSITIVE RHEUMATOID ARTHRITIS OF MULTIPLE SITES (HCC): ICD-10-CM

## 2019-01-21 DIAGNOSIS — M05.79 SEROPOSITIVE RHEUMATOID ARTHRITIS OF MULTIPLE SITES (HCC): Primary | ICD-10-CM

## 2019-01-21 LAB
ALBUMIN SERPL-MCNC: 3.8 G/DL (ref 3.1–4.5)
ALBUMIN/GLOB SERPL: 1 {RATIO} (ref 1–2)
ALP LIVER SERPL-CCNC: 97 U/L (ref 55–142)
ALT SERPL-CCNC: 10 U/L (ref 14–54)
ANION GAP SERPL CALC-SCNC: 6 MMOL/L (ref 0–18)
AST SERPL-CCNC: 16 U/L (ref 15–41)
BASOPHILS # BLD AUTO: 0.03 X10(3) UL (ref 0–0.1)
BASOPHILS NFR BLD AUTO: 0.7 %
BILIRUB SERPL-MCNC: 0.8 MG/DL (ref 0.1–2)
BUN BLD-MCNC: 15 MG/DL (ref 8–20)
BUN/CREAT SERPL: 17.9 (ref 10–20)
CALCIUM BLD-MCNC: 9 MG/DL (ref 8.3–10.3)
CHLORIDE SERPL-SCNC: 106 MMOL/L (ref 101–111)
CO2 SERPL-SCNC: 28 MMOL/L (ref 22–32)
CREAT BLD-MCNC: 0.84 MG/DL (ref 0.55–1.02)
EOSINOPHIL # BLD AUTO: 0.18 X10(3) UL (ref 0–0.3)
EOSINOPHIL NFR BLD AUTO: 4.1 %
ERYTHROCYTE [DISTWIDTH] IN BLOOD BY AUTOMATED COUNT: 17.5 % (ref 11.5–16)
GLOBULIN PLAS-MCNC: 4 G/DL (ref 2.8–4.4)
GLUCOSE BLD-MCNC: 101 MG/DL (ref 70–99)
HCT VFR BLD AUTO: 34.7 % (ref 34–50)
HGB BLD-MCNC: 10.5 G/DL (ref 12–16)
IMMATURE GRANULOCYTE COUNT: 0.01 X10(3) UL (ref 0–1)
IMMATURE GRANULOCYTE RATIO %: 0.2 %
LYMPHOCYTES # BLD AUTO: 1.08 X10(3) UL (ref 0.9–4)
LYMPHOCYTES NFR BLD AUTO: 24.4 %
M PROTEIN MFR SERPL ELPH: 7.8 G/DL (ref 6.4–8.2)
MCH RBC QN AUTO: 28.5 PG (ref 27–33.2)
MCHC RBC AUTO-ENTMCNC: 30.3 G/DL (ref 31–37)
MCV RBC AUTO: 94.3 FL (ref 81–100)
MONOCYTES # BLD AUTO: 0.38 X10(3) UL (ref 0.1–1)
MONOCYTES NFR BLD AUTO: 8.6 %
NEUTROPHIL ABS PRELIM: 2.74 X10 (3) UL (ref 1.3–6.7)
NEUTROPHILS # BLD AUTO: 2.74 X10(3) UL (ref 1.3–6.7)
NEUTROPHILS NFR BLD AUTO: 62 %
OSMOLALITY SERPL CALC.SUM OF ELEC: 291 MOSM/KG (ref 275–295)
PLATELET # BLD AUTO: 232 10(3)UL (ref 150–450)
POTASSIUM SERPL-SCNC: 4.3 MMOL/L (ref 3.6–5.1)
RBC # BLD AUTO: 3.68 X10(6)UL (ref 3.8–5.1)
RED CELL DISTRIBUTION WIDTH-SD: 58.6 FL (ref 35.1–46.3)
RHEUMATOID FACT SERPL-ACNC: 185 IU/ML (ref ?–15)
SED RATE-ML: 44 MM/HR (ref 0–25)
SODIUM SERPL-SCNC: 140 MMOL/L (ref 136–144)
WBC # BLD AUTO: 4.4 X10(3) UL (ref 4–13)

## 2019-01-21 PROCEDURE — 80053 COMPREHEN METABOLIC PANEL: CPT

## 2019-01-21 PROCEDURE — 86431 RHEUMATOID FACTOR QUANT: CPT

## 2019-01-21 PROCEDURE — 99214 OFFICE O/P EST MOD 30 MIN: CPT | Performed by: INTERNAL MEDICINE

## 2019-01-21 PROCEDURE — 85652 RBC SED RATE AUTOMATED: CPT

## 2019-01-21 PROCEDURE — 85025 COMPLETE CBC W/AUTO DIFF WBC: CPT

## 2019-01-21 RX ORDER — GABAPENTIN 300 MG/1
300 CAPSULE ORAL 2 TIMES DAILY
Qty: 60 CAPSULE | Refills: 5 | Status: SHIPPED | OUTPATIENT
Start: 2019-01-21 | End: 2019-10-17

## 2019-01-21 RX ORDER — HYDROCODONE BITARTRATE AND ACETAMINOPHEN 5; 325 MG/1; MG/1
2 TABLET ORAL EVERY 8 HOURS PRN
Qty: 180 TABLET | Refills: 0 | Status: SHIPPED | OUTPATIENT
Start: 2019-03-19 | End: 2019-04-18

## 2019-01-21 RX ORDER — FOLIC ACID 1 MG/1
1 TABLET ORAL DAILY
Qty: 90 TABLET | Refills: 5 | Status: SHIPPED | OUTPATIENT
Start: 2019-01-21 | End: 2019-07-01

## 2019-01-21 RX ORDER — ESOMEPRAZOLE MAGNESIUM 40 MG/1
40 CAPSULE, DELAYED RELEASE ORAL
Qty: 30 CAPSULE | Refills: 2 | Status: SHIPPED | OUTPATIENT
Start: 2019-01-21 | End: 2019-04-19 | Stop reason: ALTCHOICE

## 2019-01-21 RX ORDER — HYDROCODONE BITARTRATE AND ACETAMINOPHEN 5; 325 MG/1; MG/1
2 TABLET ORAL EVERY 8 HOURS PRN
Qty: 180 TABLET | Refills: 0 | Status: SHIPPED | OUTPATIENT
Start: 2019-01-21 | End: 2019-02-20

## 2019-01-21 RX ORDER — DIAZEPAM 5 MG/1
5 TABLET ORAL
Qty: 100 TABLET | Refills: 1 | Status: SHIPPED | OUTPATIENT
Start: 2019-01-21 | End: 2019-02-20

## 2019-01-21 RX ORDER — HYDROCODONE BITARTRATE AND ACETAMINOPHEN 5; 325 MG/1; MG/1
2 TABLET ORAL EVERY 8 HOURS PRN
Qty: 180 TABLET | Refills: 0 | Status: SHIPPED | OUTPATIENT
Start: 2019-02-20 | End: 2019-03-22

## 2019-01-21 NOTE — PATIENT INSTRUCTIONS
Use Norco 5 mg 2 tablets 3 times a day for pain. Use Gabapentin 300 mg twice a day for Fibromyalgia/muscle pain. .  Use Methotrexate 5 tablets per week. Use Folic acid daily. Use Diazepam 5 mg for cramps or anxiety as needed.   Use Voltaren gel daily 3-4

## 2019-01-21 NOTE — PROGRESS NOTES
[facility]  Dr. Ortiz [place] Progress Note     Subjective:   Rylie Maldonado is a(n) 70year old female. Current complaints: Patient presents with:  Rheumatoid Arthritis: 3 month f/u. Pt needs labs drawn. Rapid 3-high severity.  Pt states she has \"pain al

## 2019-01-22 NOTE — TELEPHONE ENCOUNTER
Patient was called. Lab results discussed. Sed rate 44. Rheumatoid factor I04. Increase methotrexate 6 tablets/week.   Dr. Giordano Hamper

## 2019-03-14 ENCOUNTER — TELEPHONE (OUTPATIENT)
Dept: RHEUMATOLOGY | Facility: CLINIC | Age: 79
End: 2019-03-14

## 2019-03-14 NOTE — TELEPHONE ENCOUNTER
LVM for patient to call the office to clarify if she requires the brand drug Nexium before a prior authorization is initiated. Our office did write an order for the generic form of Nexium,omeperazole 1/21/2019.

## 2019-04-19 ENCOUNTER — OFFICE VISIT (OUTPATIENT)
Dept: RHEUMATOLOGY | Facility: CLINIC | Age: 79
End: 2019-04-19
Payer: MEDICARE

## 2019-04-19 VITALS
BODY MASS INDEX: 49.16 KG/M2 | DIASTOLIC BLOOD PRESSURE: 68 MMHG | RESPIRATION RATE: 20 BRPM | HEIGHT: 62.5 IN | HEART RATE: 80 BPM | WEIGHT: 274 LBS | SYSTOLIC BLOOD PRESSURE: 110 MMHG

## 2019-04-19 DIAGNOSIS — M15.9 PRIMARY OSTEOARTHRITIS INVOLVING MULTIPLE JOINTS: ICD-10-CM

## 2019-04-19 DIAGNOSIS — D63.8 ANEMIA OF CHRONIC DISEASE: ICD-10-CM

## 2019-04-19 DIAGNOSIS — M47.816 PRIMARY OSTEOARTHRITIS OF LUMBAR SPINE: ICD-10-CM

## 2019-04-19 DIAGNOSIS — E66.01 CLASS 3 SEVERE OBESITY DUE TO EXCESS CALORIES WITHOUT SERIOUS COMORBIDITY WITH BODY MASS INDEX (BMI) OF 60.0 TO 69.9 IN ADULT (HCC): ICD-10-CM

## 2019-04-19 DIAGNOSIS — M05.79 SEROPOSITIVE RHEUMATOID ARTHRITIS OF MULTIPLE SITES (HCC): Primary | ICD-10-CM

## 2019-04-19 DIAGNOSIS — K21.9 GASTROESOPHAGEAL REFLUX DISEASE WITHOUT ESOPHAGITIS: ICD-10-CM

## 2019-04-19 DIAGNOSIS — M79.7 FIBROMYALGIA: ICD-10-CM

## 2019-04-19 DIAGNOSIS — Z98.1 S/P LUMBAR FUSION: ICD-10-CM

## 2019-04-19 PROCEDURE — 99214 OFFICE O/P EST MOD 30 MIN: CPT | Performed by: INTERNAL MEDICINE

## 2019-04-19 RX ORDER — HYDROCODONE BITARTRATE AND ACETAMINOPHEN 5; 325 MG/1; MG/1
2 TABLET ORAL 3 TIMES DAILY PRN
Qty: 180 TABLET | Refills: 0 | Status: SHIPPED | OUTPATIENT
Start: 2019-06-17 | End: 2019-04-23 | Stop reason: RX

## 2019-04-19 RX ORDER — ESOMEPRAZOLE MAGNESIUM 40 MG/1
40 CAPSULE, DELAYED RELEASE ORAL
Qty: 30 CAPSULE | Refills: 2 | Status: CANCELLED | OUTPATIENT
Start: 2019-04-19

## 2019-04-19 RX ORDER — HYDROCODONE BITARTRATE AND ACETAMINOPHEN 5; 325 MG/1; MG/1
2 TABLET ORAL 2 TIMES DAILY PRN
Qty: 180 TABLET | Refills: 0 | Status: SHIPPED | OUTPATIENT
Start: 2019-05-18 | End: 2019-04-23 | Stop reason: RX

## 2019-04-19 RX ORDER — GABAPENTIN 300 MG/1
300 CAPSULE ORAL 2 TIMES DAILY
Qty: 60 CAPSULE | Refills: 0 | Status: CANCELLED | OUTPATIENT
Start: 2019-04-19

## 2019-04-19 RX ORDER — OMEPRAZOLE 40 MG/1
40 CAPSULE, DELAYED RELEASE ORAL 2 TIMES DAILY
Qty: 60 CAPSULE | Refills: 3 | Status: SHIPPED | OUTPATIENT
Start: 2019-04-19 | End: 2019-07-01

## 2019-04-19 RX ORDER — HYDROCODONE BITARTRATE AND ACETAMINOPHEN 5; 325 MG/1; MG/1
2 TABLET ORAL 3 TIMES DAILY
Qty: 180 TABLET | Refills: 0 | Status: SHIPPED | OUTPATIENT
Start: 2019-04-19 | End: 2019-04-23 | Stop reason: RX

## 2019-04-19 NOTE — PATIENT INSTRUCTIONS
Current plan is to increase methotrexate from 5 tablets/week to 6 tablets/week to help with the rheumatoid arthritis. Continue to take the vitamin folic acid daily with the methotrexate. Folic acid helps prevent side effects from the methotrexate.   For s

## 2019-04-19 NOTE — PROGRESS NOTES
[facility]  Dr. Waldrop Exon Progress Note     Subjective:   Marva Camarillo is a(n) 70year old female.    Current complaints: Patient presents with:  Rheumatoid Arthritis: 3 month f/u, no recent labs, Rapid 3 high severity, reports poor relief from generic of 6 Norco per day maximum. Use gabapentin 300 mg once or twice a day for additional pain relief as needed. Apply Voltaren gel 3-4 times to sore joints. Discontinue Eliquis. Have your gastroscopy done by Alvaro Rowe later this upcoming week.   Try to lose

## 2019-04-22 ENCOUNTER — TELEPHONE (OUTPATIENT)
Dept: RHEUMATOLOGY | Facility: CLINIC | Age: 79
End: 2019-04-22

## 2019-04-22 NOTE — TELEPHONE ENCOUNTER
[address] whom stated pt does get Brand only of hydrocodone. Per Dr. Red Whitlock: patient  could try Percocet 5 mg 3 times per day in place of the [address].  She has to bring back to [address] [facility] prescriptions.  Dr. Red Whitlock.  Daughter informed and will talk to renita

## 2019-04-22 NOTE — TELEPHONE ENCOUNTER
No pharmacy can find namebrand Norco, pt does not want to take generic. Pt cannot take NSAIDS-on Eliques. Daughter wants to find something else for pt's pain.

## 2019-04-23 RX ORDER — TRAMADOL HYDROCHLORIDE 50 MG/1
TABLET ORAL 3 TIMES DAILY
Qty: 180 TABLET | Refills: 2 | Status: SHIPPED | OUTPATIENT
Start: 2019-04-23 | End: 2019-07-01 | Stop reason: ALTCHOICE

## 2019-04-23 NOTE — TELEPHONE ENCOUNTER
Daughter brought in 3 norco scripts dated 4/19/19, 5/18/19, and 6/17/19-shredded due to fact pt could not get Brand hydrocodone. Pt wants to switch to tramadol.   Need to fax to [place].

## 2019-07-01 ENCOUNTER — OFFICE VISIT (OUTPATIENT)
Dept: RHEUMATOLOGY | Facility: CLINIC | Age: 79
End: 2019-07-01
Payer: MEDICARE

## 2019-07-01 VITALS
RESPIRATION RATE: 18 BRPM | DIASTOLIC BLOOD PRESSURE: 72 MMHG | HEART RATE: 64 BPM | HEIGHT: 62.5 IN | SYSTOLIC BLOOD PRESSURE: 106 MMHG | WEIGHT: 260 LBS | BODY MASS INDEX: 46.65 KG/M2

## 2019-07-01 DIAGNOSIS — M79.7 FIBROMYALGIA: ICD-10-CM

## 2019-07-01 DIAGNOSIS — M05.79 SEROPOSITIVE RHEUMATOID ARTHRITIS OF MULTIPLE SITES (HCC): Primary | ICD-10-CM

## 2019-07-01 PROCEDURE — 99213 OFFICE O/P EST LOW 20 MIN: CPT | Performed by: INTERNAL MEDICINE

## 2019-07-01 RX ORDER — HYDROCODONE BITARTRATE AND ACETAMINOPHEN 7.5; 325 MG/1; MG/1
2 TABLET ORAL EVERY 8 HOURS PRN
Qty: 180 TABLET | Refills: 0 | Status: SHIPPED | OUTPATIENT
Start: 2019-08-01 | End: 2019-08-31

## 2019-07-01 RX ORDER — OMEPRAZOLE 40 MG/1
40 CAPSULE, DELAYED RELEASE ORAL 2 TIMES DAILY
Qty: 60 CAPSULE | Refills: 3 | Status: SHIPPED | OUTPATIENT
Start: 2019-07-01 | End: 2019-11-14

## 2019-07-01 RX ORDER — HYDROCODONE BITARTRATE AND ACETAMINOPHEN 5; 325 MG/1; MG/1
2 TABLET ORAL EVERY 8 HOURS PRN
Qty: 180 TABLET | Refills: 0 | Status: CANCELLED | OUTPATIENT
Start: 2019-07-17 | End: 2019-08-16

## 2019-07-01 RX ORDER — TRAMADOL HYDROCHLORIDE 50 MG/1
TABLET ORAL 3 TIMES DAILY
Qty: 180 TABLET | Refills: 2 | Status: CANCELLED | OUTPATIENT
Start: 2019-07-23 | End: 2019-08-22

## 2019-07-01 RX ORDER — FOLIC ACID 1 MG/1
1 TABLET ORAL DAILY
Qty: 90 TABLET | Refills: 5 | Status: SHIPPED | OUTPATIENT
Start: 2019-07-01 | End: 2019-10-17

## 2019-07-01 RX ORDER — HYDROCODONE BITARTRATE AND ACETAMINOPHEN 5; 325 MG/1; MG/1
2 TABLET ORAL EVERY 8 HOURS PRN
Qty: 180 TABLET | Refills: 0 | Status: CANCELLED | OUTPATIENT
Start: 2019-09-15 | End: 2019-10-15

## 2019-07-01 RX ORDER — HYDROCODONE BITARTRATE AND ACETAMINOPHEN 7.5; 325 MG/1; MG/1
2 TABLET ORAL EVERY 8 HOURS PRN
Qty: 180 TABLET | Refills: 0 | Status: SHIPPED | OUTPATIENT
Start: 2019-07-01 | End: 2019-07-31

## 2019-07-01 RX ORDER — HYDROCODONE BITARTRATE AND ACETAMINOPHEN 7.5; 325 MG/1; MG/1
2 TABLET ORAL EVERY 8 HOURS PRN
Qty: 180 TABLET | Refills: 0 | Status: SHIPPED | OUTPATIENT
Start: 2019-09-01 | End: 2019-10-01

## 2019-07-01 RX ORDER — HYDROCODONE BITARTRATE AND ACETAMINOPHEN 5; 325 MG/1; MG/1
2 TABLET ORAL EVERY 8 HOURS PRN
Qty: 180 TABLET | Refills: 0 | Status: CANCELLED | OUTPATIENT
Start: 2019-08-16 | End: 2019-09-15

## 2019-07-01 NOTE — PROGRESS NOTES
[facility]  Dr. Yuki Carmen Progress Note     Subjective:   Bry Jones is a(n) 70year old female. Current complaints: Patient presents with:  Rheumatoid Arthritis: 3 month f/u. Labs not done.  Pt stated she did not take norco-only wants brand and it

## 2019-07-01 NOTE — PATIENT INSTRUCTIONS
Increase Norco 7.5 mg 2 tablets 3 times per day. Use Methotrexate 6 tablets per week. Use Folic acid 1 mg per day. Use Gabapentin 300 mg twice a day for pain control. Omeprazole daily for stomach.   Use Voltaren gel apply 3  Times per day to sore joints

## 2019-08-14 RX ORDER — DIAZEPAM 5 MG/1
TABLET ORAL
Qty: 100 TABLET | Refills: 0 | Status: SHIPPED | OUTPATIENT
Start: 2019-08-14 | End: 2019-10-17

## 2019-10-17 ENCOUNTER — TELEPHONE (OUTPATIENT)
Dept: RHEUMATOLOGY | Facility: CLINIC | Age: 79
End: 2019-10-17

## 2019-10-17 ENCOUNTER — OFFICE VISIT (OUTPATIENT)
Dept: RHEUMATOLOGY | Facility: CLINIC | Age: 79
End: 2019-10-17
Payer: MEDICARE

## 2019-10-17 VITALS
WEIGHT: 260 LBS | HEIGHT: 62.5 IN | BODY MASS INDEX: 46.65 KG/M2 | RESPIRATION RATE: 20 BRPM | SYSTOLIC BLOOD PRESSURE: 102 MMHG | DIASTOLIC BLOOD PRESSURE: 66 MMHG | HEART RATE: 84 BPM

## 2019-10-17 DIAGNOSIS — M15.9 PRIMARY OSTEOARTHRITIS INVOLVING MULTIPLE JOINTS: ICD-10-CM

## 2019-10-17 DIAGNOSIS — M79.7 FIBROMYALGIA: ICD-10-CM

## 2019-10-17 DIAGNOSIS — M47.816 PRIMARY OSTEOARTHRITIS OF LUMBAR SPINE: ICD-10-CM

## 2019-10-17 DIAGNOSIS — E66.01 CLASS 3 SEVERE OBESITY DUE TO EXCESS CALORIES WITHOUT SERIOUS COMORBIDITY WITH BODY MASS INDEX (BMI) OF 60.0 TO 69.9 IN ADULT (HCC): ICD-10-CM

## 2019-10-17 DIAGNOSIS — M05.79 SEROPOSITIVE RHEUMATOID ARTHRITIS OF MULTIPLE SITES (HCC): Primary | ICD-10-CM

## 2019-10-17 PROBLEM — M79.675 PAIN OF TOE OF LEFT FOOT: Status: ACTIVE | Noted: 2019-10-17

## 2019-10-17 PROCEDURE — 99213 OFFICE O/P EST LOW 20 MIN: CPT | Performed by: INTERNAL MEDICINE

## 2019-10-17 RX ORDER — FOLIC ACID 1 MG/1
1 TABLET ORAL DAILY
Qty: 90 TABLET | Refills: 5 | Status: SHIPPED | OUTPATIENT
Start: 2019-10-17 | End: 2020-07-13

## 2019-10-17 RX ORDER — HYDROCODONE BITARTRATE AND ACETAMINOPHEN 7.5; 325 MG/1; MG/1
2 TABLET ORAL EVERY 8 HOURS PRN
Qty: 180 TABLET | Refills: 0 | Status: SHIPPED | OUTPATIENT
Start: 2019-12-16 | End: 2020-01-15

## 2019-10-17 RX ORDER — LIDOCAINE 40 MG/G
4 CREAM TOPICAL 4 TIMES DAILY PRN
Qty: 120 G | Refills: 2 | Status: SHIPPED | OUTPATIENT
Start: 2019-10-17 | End: 2020-01-17

## 2019-10-17 RX ORDER — HYDROCODONE BITARTRATE AND ACETAMINOPHEN 7.5; 325 MG/1; MG/1
2 TABLET ORAL EVERY 8 HOURS PRN
Qty: 180 TABLET | Refills: 0 | Status: SHIPPED | OUTPATIENT
Start: 2019-11-16 | End: 2019-12-16

## 2019-10-17 RX ORDER — GABAPENTIN 300 MG/1
300 CAPSULE ORAL 2 TIMES DAILY
Qty: 60 CAPSULE | Refills: 5 | Status: SHIPPED | OUTPATIENT
Start: 2019-10-17 | End: 2020-01-17

## 2019-10-17 RX ORDER — DIAZEPAM 5 MG/1
TABLET ORAL
Qty: 100 TABLET | Refills: 0 | Status: SHIPPED | OUTPATIENT
Start: 2019-10-17 | End: 2020-01-06

## 2019-10-17 RX ORDER — HYDROCODONE BITARTRATE AND ACETAMINOPHEN 7.5; 325 MG/1; MG/1
2 TABLET ORAL EVERY 8 HOURS PRN
Qty: 180 TABLET | Refills: 0 | Status: SHIPPED | OUTPATIENT
Start: 2019-10-17 | End: 2019-11-16

## 2019-10-17 NOTE — TELEPHONE ENCOUNTER
[place] phoned office for clarification on lidocaine external cream. Order for 4mg topically changed to 4gm topically per Dr. Mihaela Adkins.

## 2019-10-17 NOTE — PROGRESS NOTES
[facility]  Dr. Ortiz Pinson Progress Note     Subjective:   Rylie Maldonado is a(n) 70year old female.    Current complaints: Patient presents with:  Rheumatoid Arthritis: 3 month f/u, no labs, Rapid 3 unable to complete, fell this week, feet are swollen a

## 2019-10-17 NOTE — PATIENT INSTRUCTIONS
For chronic pain take Norco 7.5 mg 2 tablets 3 times per day. For rheumatoid arthritis use your methotrexate 6 tablets/week along with folic acid 1 mg a day. Also for pain use gabapentin 300 mg generally twice a day if there is severe pain.   Remember to

## 2019-11-14 DIAGNOSIS — M05.79 SEROPOSITIVE RHEUMATOID ARTHRITIS OF MULTIPLE SITES (HCC): ICD-10-CM

## 2019-11-14 DIAGNOSIS — M79.7 FIBROMYALGIA: ICD-10-CM

## 2019-11-14 RX ORDER — OMEPRAZOLE 40 MG/1
CAPSULE, DELAYED RELEASE ORAL
Qty: 60 CAPSULE | Refills: 2 | Status: SHIPPED | OUTPATIENT
Start: 2019-11-14 | End: 2020-01-17

## 2020-01-06 RX ORDER — DIAZEPAM 5 MG/1
TABLET ORAL
Qty: 100 TABLET | Refills: 1 | Status: SHIPPED | OUTPATIENT
Start: 2020-01-06 | End: 2020-01-17

## 2020-01-17 ENCOUNTER — OFFICE VISIT (OUTPATIENT)
Dept: RHEUMATOLOGY | Facility: CLINIC | Age: 80
End: 2020-01-17
Payer: MEDICARE

## 2020-01-17 VITALS
HEIGHT: 62.5 IN | RESPIRATION RATE: 20 BRPM | SYSTOLIC BLOOD PRESSURE: 108 MMHG | DIASTOLIC BLOOD PRESSURE: 62 MMHG | BODY MASS INDEX: 46.65 KG/M2 | WEIGHT: 260 LBS | HEART RATE: 86 BPM

## 2020-01-17 DIAGNOSIS — M79.7 FIBROMYALGIA: ICD-10-CM

## 2020-01-17 DIAGNOSIS — E66.01 CLASS 3 SEVERE OBESITY DUE TO EXCESS CALORIES WITHOUT SERIOUS COMORBIDITY WITH BODY MASS INDEX (BMI) OF 45.0 TO 49.9 IN ADULT (HCC): ICD-10-CM

## 2020-01-17 DIAGNOSIS — M47.816 PRIMARY OSTEOARTHRITIS OF LUMBAR SPINE: Primary | ICD-10-CM

## 2020-01-17 DIAGNOSIS — M05.79 SEROPOSITIVE RHEUMATOID ARTHRITIS OF MULTIPLE SITES (HCC): ICD-10-CM

## 2020-01-17 DIAGNOSIS — K21.9 GASTROESOPHAGEAL REFLUX DISEASE WITHOUT ESOPHAGITIS: ICD-10-CM

## 2020-01-17 DIAGNOSIS — E55.9 VITAMIN D DEFICIENCY: ICD-10-CM

## 2020-01-17 DIAGNOSIS — M15.9 PRIMARY OSTEOARTHRITIS INVOLVING MULTIPLE JOINTS: ICD-10-CM

## 2020-01-17 PROCEDURE — 99213 OFFICE O/P EST LOW 20 MIN: CPT | Performed by: INTERNAL MEDICINE

## 2020-01-17 RX ORDER — DIAZEPAM 5 MG/1
5 TABLET ORAL EVERY 6 HOURS PRN
Qty: 100 TABLET | Refills: 2 | Status: SHIPPED | OUTPATIENT
Start: 2020-01-17 | End: 2020-04-13

## 2020-01-17 RX ORDER — OMEPRAZOLE 40 MG/1
CAPSULE, DELAYED RELEASE ORAL
Qty: 60 CAPSULE | Refills: 2 | Status: SHIPPED | OUTPATIENT
Start: 2020-01-17 | End: 2020-10-09

## 2020-01-17 RX ORDER — HYDROCODONE BITARTRATE AND ACETAMINOPHEN 7.5; 325 MG/1; MG/1
2 TABLET ORAL EVERY 8 HOURS PRN
Qty: 180 TABLET | Refills: 0 | Status: SHIPPED | OUTPATIENT
Start: 2020-01-17 | End: 2020-02-16

## 2020-01-17 RX ORDER — HYDROCODONE BITARTRATE AND ACETAMINOPHEN 7.5; 325 MG/1; MG/1
2 TABLET ORAL EVERY 8 HOURS PRN
Qty: 180 TABLET | Refills: 0 | Status: SHIPPED | OUTPATIENT
Start: 2020-03-17 | End: 2020-04-16

## 2020-01-17 RX ORDER — ONDANSETRON 4 MG/1
4 TABLET, FILM COATED ORAL EVERY 12 HOURS PRN
Qty: 60 TABLET | Refills: 2 | Status: SHIPPED | OUTPATIENT
Start: 2020-01-17

## 2020-01-17 RX ORDER — GABAPENTIN 300 MG/1
300 CAPSULE ORAL 3 TIMES DAILY
Qty: 90 CAPSULE | Refills: 5 | Status: SHIPPED | OUTPATIENT
Start: 2020-01-17 | End: 2021-08-12

## 2020-01-17 RX ORDER — ERGOCALCIFEROL 1.25 MG/1
50000 CAPSULE ORAL WEEKLY
Qty: 12 CAPSULE | Refills: 3 | Status: SHIPPED | OUTPATIENT
Start: 2020-01-17 | End: 2021-08-12

## 2020-01-17 RX ORDER — LIDOCAINE 40 MG/G
4 CREAM TOPICAL 4 TIMES DAILY PRN
Qty: 120 G | Refills: 2 | Status: SHIPPED | OUTPATIENT
Start: 2020-01-17

## 2020-01-17 RX ORDER — HYDROCODONE BITARTRATE AND ACETAMINOPHEN 7.5; 325 MG/1; MG/1
2 TABLET ORAL EVERY 8 HOURS PRN
Qty: 180 TABLET | Refills: 0 | Status: SHIPPED | OUTPATIENT
Start: 2020-02-16 | End: 2020-03-17

## 2020-01-17 NOTE — PATIENT INSTRUCTIONS
Use Norco 7.5 mg,  2 tablets 3 times per day for pain, up to 6 per day. Use Methotrexate 6 tablets per week. Use Folic Acid 1 mg per day. Gabapentin 3oo mg twice a day. Use diclofenac cream  For arthritis pain. Lidocaine cream for pain.   Gabapentin 3

## 2020-04-13 ENCOUNTER — VIRTUAL PHONE E/M (OUTPATIENT)
Dept: RHEUMATOLOGY | Facility: CLINIC | Age: 80
End: 2020-04-13
Payer: MEDICARE

## 2020-04-13 DIAGNOSIS — M79.7 FIBROMYALGIA: ICD-10-CM

## 2020-04-13 DIAGNOSIS — E66.01 CLASS 3 SEVERE OBESITY DUE TO EXCESS CALORIES WITHOUT SERIOUS COMORBIDITY WITH BODY MASS INDEX (BMI) OF 45.0 TO 49.9 IN ADULT (HCC): ICD-10-CM

## 2020-04-13 DIAGNOSIS — M05.79 SEROPOSITIVE RHEUMATOID ARTHRITIS OF MULTIPLE SITES (HCC): Primary | ICD-10-CM

## 2020-04-13 DIAGNOSIS — M15.9 PRIMARY OSTEOARTHRITIS INVOLVING MULTIPLE JOINTS: ICD-10-CM

## 2020-04-13 DIAGNOSIS — M47.816 PRIMARY OSTEOARTHRITIS OF LUMBAR SPINE: ICD-10-CM

## 2020-04-13 DIAGNOSIS — Z98.1 S/P LUMBAR FUSION: ICD-10-CM

## 2020-04-13 PROCEDURE — 99442 PHONE E/M BY PHYS 11-20 MIN: CPT | Performed by: INTERNAL MEDICINE

## 2020-04-13 RX ORDER — HYDROCODONE BITARTRATE AND ACETAMINOPHEN 7.5; 325 MG/1; MG/1
2 TABLET ORAL 3 TIMES DAILY
Qty: 180 TABLET | Refills: 0 | Status: SHIPPED | OUTPATIENT
Start: 2020-05-13 | End: 2020-06-12

## 2020-04-13 RX ORDER — HYDROCODONE BITARTRATE AND ACETAMINOPHEN 7.5; 325 MG/1; MG/1
2 TABLET ORAL 3 TIMES DAILY
Qty: 180 TABLET | Refills: 0 | Status: SHIPPED | OUTPATIENT
Start: 2020-06-13 | End: 2020-07-13

## 2020-04-13 RX ORDER — FOLIC ACID 1 MG/1
1 TABLET ORAL DAILY
Qty: 90 TABLET | Refills: 3 | Status: SHIPPED | OUTPATIENT
Start: 2020-04-13 | End: 2020-07-13

## 2020-04-13 RX ORDER — HYDROCODONE BITARTRATE AND ACETAMINOPHEN 7.5; 325 MG/1; MG/1
2 TABLET ORAL 3 TIMES DAILY
Qty: 30 TABLET | Refills: 0 | Status: SHIPPED | OUTPATIENT
Start: 2020-06-13 | End: 2020-04-13

## 2020-04-13 RX ORDER — HYDROCODONE BITARTRATE AND ACETAMINOPHEN 7.5; 325 MG/1; MG/1
2 TABLET ORAL 3 TIMES DAILY
Qty: 180 TABLET | Refills: 0 | Status: SHIPPED | OUTPATIENT
Start: 2020-04-13 | End: 2020-05-13

## 2020-04-13 RX ORDER — DIAZEPAM 5 MG/1
5 TABLET ORAL EVERY 6 HOURS PRN
Qty: 100 TABLET | Refills: 2 | Status: SHIPPED | OUTPATIENT
Start: 2020-04-13 | End: 2020-10-09

## 2020-04-13 NOTE — PROGRESS NOTES
Virtual Telephone Check-In    Ricardo paez consents to a Virtual/Telephone Check-In visit on 04/13/20. Patient understands and accepts financial responsibility for any deductible, co-insurance and/or co-pays associated with this service.     Du

## 2020-05-14 DIAGNOSIS — M79.7 FIBROMYALGIA: ICD-10-CM

## 2020-05-14 DIAGNOSIS — M05.79 SEROPOSITIVE RHEUMATOID ARTHRITIS OF MULTIPLE SITES (HCC): ICD-10-CM

## 2020-05-14 RX ORDER — OMEPRAZOLE 40 MG/1
CAPSULE, DELAYED RELEASE ORAL
Qty: 60 CAPSULE | Refills: 0 | OUTPATIENT
Start: 2020-05-14

## 2020-07-13 ENCOUNTER — TELEMEDICINE (OUTPATIENT)
Dept: RHEUMATOLOGY | Facility: CLINIC | Age: 80
End: 2020-07-13
Payer: MEDICARE

## 2020-07-13 VITALS — WEIGHT: 260 LBS | HEIGHT: 62.5 IN | BODY MASS INDEX: 46.65 KG/M2

## 2020-07-13 DIAGNOSIS — Z98.1 S/P LUMBAR FUSION: ICD-10-CM

## 2020-07-13 DIAGNOSIS — E66.01 CLASS 3 SEVERE OBESITY DUE TO EXCESS CALORIES WITHOUT SERIOUS COMORBIDITY WITH BODY MASS INDEX (BMI) OF 45.0 TO 49.9 IN ADULT (HCC): ICD-10-CM

## 2020-07-13 DIAGNOSIS — M15.9 PRIMARY OSTEOARTHRITIS INVOLVING MULTIPLE JOINTS: Primary | ICD-10-CM

## 2020-07-13 DIAGNOSIS — M47.816 PRIMARY OSTEOARTHRITIS OF LUMBAR SPINE: ICD-10-CM

## 2020-07-13 DIAGNOSIS — M79.7 FIBROMYALGIA: ICD-10-CM

## 2020-07-13 DIAGNOSIS — M05.79 SEROPOSITIVE RHEUMATOID ARTHRITIS OF MULTIPLE SITES (HCC): ICD-10-CM

## 2020-07-13 PROCEDURE — 99213 OFFICE O/P EST LOW 20 MIN: CPT | Performed by: INTERNAL MEDICINE

## 2020-07-13 RX ORDER — FOLIC ACID 1 MG/1
1 TABLET ORAL DAILY
Qty: 90 TABLET | Refills: 5 | Status: SHIPPED | OUTPATIENT
Start: 2020-07-13 | End: 2021-01-07

## 2020-07-13 RX ORDER — ERGOCALCIFEROL 1.25 MG/1
50000 CAPSULE ORAL WEEKLY
Qty: 12 CAPSULE | Refills: 3 | Status: CANCELLED | OUTPATIENT
Start: 2020-07-13

## 2020-07-13 RX ORDER — GABAPENTIN 300 MG/1
300 CAPSULE ORAL 3 TIMES DAILY
Qty: 90 CAPSULE | Refills: 5 | Status: CANCELLED | OUTPATIENT
Start: 2020-07-13

## 2020-07-13 RX ORDER — OMEPRAZOLE 40 MG/1
CAPSULE, DELAYED RELEASE ORAL
Qty: 60 CAPSULE | Refills: 2 | Status: CANCELLED | OUTPATIENT
Start: 2020-07-13

## 2020-07-13 RX ORDER — HYDROCODONE BITARTRATE AND ACETAMINOPHEN 7.5; 325 MG/1; MG/1
TABLET ORAL
Qty: 180 TABLET | Refills: 0 | Status: SHIPPED | OUTPATIENT
Start: 2020-07-22 | End: 2021-11-30 | Stop reason: WASHOUT

## 2020-07-13 RX ORDER — DIAZEPAM 5 MG/1
5 TABLET ORAL EVERY 6 HOURS PRN
Qty: 100 TABLET | Refills: 2 | Status: CANCELLED | OUTPATIENT
Start: 2020-07-13

## 2020-07-13 RX ORDER — HYDROCODONE BITARTRATE AND ACETAMINOPHEN 7.5; 325 MG/1; MG/1
TABLET ORAL
Qty: 180 TABLET | Refills: 0 | Status: SHIPPED | OUTPATIENT
Start: 2020-09-20 | End: 2020-10-09

## 2020-07-13 RX ORDER — HYDROCODONE BITARTRATE AND ACETAMINOPHEN 7.5; 325 MG/1; MG/1
TABLET ORAL
Qty: 180 TABLET | Refills: 0 | Status: SHIPPED | OUTPATIENT
Start: 2020-08-21 | End: 2021-11-30 | Stop reason: WASHOUT

## 2020-07-13 NOTE — PATIENT INSTRUCTIONS
Current plan remain on methotrexate 6 tablets/week to treat seronegative rheumatoid arthritis. Continue on folic acid 1 mg a day to help prevent side effects from methotrexate. Continue omeprazole 1 daily for GERD.   Use Norco 7.5 mg, 1 to 2 tablets every

## 2020-07-13 NOTE — PROGRESS NOTES
[facility]  Dr. Maribeth Cristina Progress Note     Subjective:   Kennedi Andrews is a(n) 70year old female. Current complaints: Patient presents with:  Rheumatoid Arthritis: est pt, fu 3mo- Pt doing pretty good.   Pt co chills today, denies fever. had orthodo

## 2020-10-09 ENCOUNTER — TELEMEDICINE (OUTPATIENT)
Dept: RHEUMATOLOGY | Facility: CLINIC | Age: 80
End: 2020-10-09
Payer: MEDICARE

## 2020-10-09 DIAGNOSIS — M15.9 PRIMARY OSTEOARTHRITIS INVOLVING MULTIPLE JOINTS: Primary | ICD-10-CM

## 2020-10-09 DIAGNOSIS — Z86.73 STATUS POST STROKE: ICD-10-CM

## 2020-10-09 DIAGNOSIS — M47.816 PRIMARY OSTEOARTHRITIS OF LUMBAR SPINE: ICD-10-CM

## 2020-10-09 DIAGNOSIS — M05.79 SEROPOSITIVE RHEUMATOID ARTHRITIS OF MULTIPLE SITES (HCC): ICD-10-CM

## 2020-10-09 DIAGNOSIS — I69.951 HEMIPARESIS OF RIGHT DOMINANT SIDE AS LATE EFFECT OF CEREBROVASCULAR DISEASE, UNSPECIFIED CEREBROVASCULAR DISEASE TYPE (HCC): ICD-10-CM

## 2020-10-09 DIAGNOSIS — M79.7 FIBROMYALGIA: ICD-10-CM

## 2020-10-09 DIAGNOSIS — E66.01 CLASS 3 SEVERE OBESITY DUE TO EXCESS CALORIES WITHOUT SERIOUS COMORBIDITY WITH BODY MASS INDEX (BMI) OF 45.0 TO 49.9 IN ADULT (HCC): ICD-10-CM

## 2020-10-09 DIAGNOSIS — Z98.1 S/P LUMBAR FUSION: ICD-10-CM

## 2020-10-09 PROCEDURE — 99213 OFFICE O/P EST LOW 20 MIN: CPT | Performed by: INTERNAL MEDICINE

## 2020-10-09 RX ORDER — HYDROCODONE BITARTRATE AND ACETAMINOPHEN 7.5; 325 MG/1; MG/1
TABLET ORAL
Qty: 180 TABLET | Refills: 0 | Status: SHIPPED | OUTPATIENT
Start: 2020-11-08 | End: 2021-08-12

## 2020-10-09 RX ORDER — OMEPRAZOLE 40 MG/1
CAPSULE, DELAYED RELEASE ORAL
Qty: 60 CAPSULE | Refills: 2 | Status: SHIPPED | OUTPATIENT
Start: 2020-10-09 | End: 2021-02-01

## 2020-10-09 RX ORDER — HYDROCODONE BITARTRATE AND ACETAMINOPHEN 7.5; 325 MG/1; MG/1
TABLET ORAL
Qty: 180 TABLET | Refills: 0 | Status: SHIPPED | OUTPATIENT
Start: 2020-12-08 | End: 2021-08-12

## 2020-10-09 RX ORDER — HYDROCODONE BITARTRATE AND ACETAMINOPHEN 7.5; 325 MG/1; MG/1
TABLET ORAL
Qty: 180 TABLET | Refills: 0 | Status: SHIPPED | OUTPATIENT
Start: 2020-10-09 | End: 2021-08-12

## 2020-10-09 RX ORDER — DIAZEPAM 5 MG/1
5 TABLET ORAL EVERY 6 HOURS PRN
Qty: 100 TABLET | Refills: 2 | Status: SHIPPED | OUTPATIENT
Start: 2020-10-09 | End: 2021-01-07

## 2020-10-09 NOTE — PATIENT INSTRUCTIONS
Unfortunately Néstor Frias suffered a stroke in July and is dealing with the aftermath. Still very weak on the right side and not able to walk. Actually having difficulty even transferring.   Continue to receive physical therapy and try to rebuild your strengt

## 2020-10-09 NOTE — PROGRESS NOTES
This visit is conducted using Telemedicine with live, interactive video and audio. Patient has been referred to the [facility] website at [email or web address] to review the yearly Consent to Treat document.     Patient understands and accepts financial res ([facility])  Plan:   Patient Instructions    Unfortunately Shannon Sosa suffered a stroke in July and is dealing with the aftermath. Still very weak on the right side and not able to walk. Actually having difficulty even transferring.   Continue to receive physical t

## 2021-01-07 ENCOUNTER — TELEMEDICINE (OUTPATIENT)
Dept: RHEUMATOLOGY | Facility: CLINIC | Age: 81
End: 2021-01-07
Payer: MEDICARE

## 2021-01-07 VITALS — WEIGHT: 273 LBS | HEIGHT: 62.5 IN | BODY MASS INDEX: 48.98 KG/M2

## 2021-01-07 DIAGNOSIS — M47.816 PRIMARY OSTEOARTHRITIS OF LUMBAR SPINE: ICD-10-CM

## 2021-01-07 DIAGNOSIS — M05.79 SEROPOSITIVE RHEUMATOID ARTHRITIS OF MULTIPLE SITES (HCC): ICD-10-CM

## 2021-01-07 DIAGNOSIS — Z98.1 S/P LUMBAR FUSION: ICD-10-CM

## 2021-01-07 DIAGNOSIS — E66.01 CLASS 3 SEVERE OBESITY DUE TO EXCESS CALORIES WITHOUT SERIOUS COMORBIDITY WITH BODY MASS INDEX (BMI) OF 45.0 TO 49.9 IN ADULT (HCC): ICD-10-CM

## 2021-01-07 DIAGNOSIS — I69.951 HEMIPARESIS OF RIGHT DOMINANT SIDE AS LATE EFFECT OF CEREBROVASCULAR DISEASE, UNSPECIFIED CEREBROVASCULAR DISEASE TYPE (HCC): ICD-10-CM

## 2021-01-07 DIAGNOSIS — M15.9 PRIMARY OSTEOARTHRITIS INVOLVING MULTIPLE JOINTS: ICD-10-CM

## 2021-01-07 DIAGNOSIS — M79.7 FIBROMYALGIA: ICD-10-CM

## 2021-01-07 DIAGNOSIS — Z86.73 STATUS POST STROKE: Primary | ICD-10-CM

## 2021-01-07 DIAGNOSIS — M19.011 ARTHRITIS OF RIGHT SHOULDER REGION: ICD-10-CM

## 2021-01-07 PROCEDURE — 99213 OFFICE O/P EST LOW 20 MIN: CPT | Performed by: INTERNAL MEDICINE

## 2021-01-07 RX ORDER — FOLIC ACID 1 MG/1
1 TABLET ORAL DAILY
Qty: 90 TABLET | Refills: 5 | Status: SHIPPED | OUTPATIENT
Start: 2021-01-07 | End: 2021-08-12

## 2021-01-07 RX ORDER — HYDROCODONE BITARTRATE AND ACETAMINOPHEN 7.5; 325 MG/1; MG/1
TABLET ORAL
Qty: 180 TABLET | Refills: 0 | Status: SHIPPED | OUTPATIENT
Start: 2021-02-21 | End: 2021-08-12

## 2021-01-07 RX ORDER — HYDROCODONE BITARTRATE AND ACETAMINOPHEN 7.5; 325 MG/1; MG/1
TABLET ORAL
Qty: 180 TABLET | Refills: 0 | Status: SHIPPED | OUTPATIENT
Start: 2021-03-21 | End: 2021-08-02

## 2021-01-07 RX ORDER — HYDROCODONE BITARTRATE AND ACETAMINOPHEN 7.5; 325 MG/1; MG/1
TABLET ORAL
Qty: 180 TABLET | Refills: 0 | Status: SHIPPED | OUTPATIENT
Start: 2021-01-22 | End: 2021-08-12

## 2021-01-07 RX ORDER — DIAZEPAM 5 MG/1
5 TABLET ORAL EVERY 6 HOURS PRN
Qty: 100 TABLET | Refills: 2 | Status: SHIPPED | OUTPATIENT
Start: 2021-01-07 | End: 2021-11-24

## 2021-01-07 NOTE — PROGRESS NOTES
This visit is conducted using Telemedicine with live, interactive video and audio. Patient has been referred to the [facility] website at [email or web address] to review the yearly Consent to Treat document.     Patient understands and accepts financial res who comes to your home. .  Return to my office in 3 months for recheck.         Park Caballero MD 1/3/2718 26:32 AM

## 2021-01-07 NOTE — PATIENT INSTRUCTIONS
Continue to use methotrexate weekly for treatment of rheumatoid arthritis. Folic acid 1 mg/day. Continue to do PT following your stroke. For severe pain take Norco 7.5 mg tablets, use 2 tablets 3 times a day if needed up to 6 a day.   Use diazepam 5 mg 1

## 2021-01-30 DIAGNOSIS — M05.79 SEROPOSITIVE RHEUMATOID ARTHRITIS OF MULTIPLE SITES (HCC): ICD-10-CM

## 2021-01-30 DIAGNOSIS — M79.7 FIBROMYALGIA: ICD-10-CM

## 2021-02-01 RX ORDER — OMEPRAZOLE 40 MG/1
CAPSULE, DELAYED RELEASE ORAL
Qty: 60 CAPSULE | Refills: 0 | Status: SHIPPED | OUTPATIENT
Start: 2021-02-01 | End: 2021-06-09

## 2021-02-03 DIAGNOSIS — Z23 NEED FOR VACCINATION: ICD-10-CM

## 2021-04-01 ENCOUNTER — TELEPHONE (OUTPATIENT)
Dept: RHEUMATOLOGY | Facility: CLINIC | Age: 81
End: 2021-04-01

## 2021-04-01 NOTE — TELEPHONE ENCOUNTER
Called daughter, rescheduled today to next Thursday. Daughter requesting injections for shoulder and knee at that appt.

## 2021-04-01 NOTE — TELEPHONE ENCOUNTER
Daughter called requesting to change 10am to video visit today. States pt woke up with diarrhea and is not feeling well. Pt is afebrile. Please advise.

## 2021-04-08 ENCOUNTER — OFFICE VISIT (OUTPATIENT)
Dept: RHEUMATOLOGY | Facility: CLINIC | Age: 81
End: 2021-04-08
Payer: MEDICARE

## 2021-04-08 VITALS
RESPIRATION RATE: 20 BRPM | DIASTOLIC BLOOD PRESSURE: 86 MMHG | HEART RATE: 71 BPM | TEMPERATURE: 99 F | OXYGEN SATURATION: 94 % | SYSTOLIC BLOOD PRESSURE: 128 MMHG

## 2021-04-08 DIAGNOSIS — M15.9 PRIMARY OSTEOARTHRITIS INVOLVING MULTIPLE JOINTS: Primary | ICD-10-CM

## 2021-04-08 DIAGNOSIS — E66.01 CLASS 3 SEVERE OBESITY DUE TO EXCESS CALORIES WITHOUT SERIOUS COMORBIDITY WITH BODY MASS INDEX (BMI) OF 45.0 TO 49.9 IN ADULT (HCC): ICD-10-CM

## 2021-04-08 DIAGNOSIS — M19.011 PRIMARY OSTEOARTHRITIS, RIGHT SHOULDER: ICD-10-CM

## 2021-04-08 DIAGNOSIS — M17.11 PRIMARY OSTEOARTHRITIS OF RIGHT KNEE: ICD-10-CM

## 2021-04-08 DIAGNOSIS — Z98.1 S/P LUMBAR FUSION: ICD-10-CM

## 2021-04-08 DIAGNOSIS — I69.951 HEMIPARESIS OF RIGHT DOMINANT SIDE AS LATE EFFECT OF CEREBROVASCULAR DISEASE, UNSPECIFIED CEREBROVASCULAR DISEASE TYPE (HCC): ICD-10-CM

## 2021-04-08 DIAGNOSIS — M19.011 ARTHRITIS OF RIGHT SHOULDER REGION: ICD-10-CM

## 2021-04-08 DIAGNOSIS — Z86.73 STATUS POST STROKE: ICD-10-CM

## 2021-04-08 DIAGNOSIS — M05.79 SEROPOSITIVE RHEUMATOID ARTHRITIS OF MULTIPLE SITES (HCC): ICD-10-CM

## 2021-04-08 PROCEDURE — 20610 DRAIN/INJ JOINT/BURSA W/O US: CPT | Performed by: INTERNAL MEDICINE

## 2021-04-08 PROCEDURE — 99213 OFFICE O/P EST LOW 20 MIN: CPT | Performed by: INTERNAL MEDICINE

## 2021-04-08 RX ORDER — HYDROCODONE BITARTRATE AND ACETAMINOPHEN 7.5; 325 MG/1; MG/1
2 TABLET ORAL EVERY 8 HOURS PRN
Qty: 180 TABLET | Refills: 0 | Status: SHIPPED | OUTPATIENT
Start: 2021-05-08 | End: 2021-06-07

## 2021-04-08 RX ORDER — HYDROCODONE BITARTRATE AND ACETAMINOPHEN 7.5; 325 MG/1; MG/1
2 TABLET ORAL EVERY 8 HOURS PRN
Qty: 180 TABLET | Refills: 0 | Status: SHIPPED | OUTPATIENT
Start: 2021-04-08 | End: 2021-05-08

## 2021-04-08 RX ORDER — METHYLPREDNISOLONE ACETATE 40 MG/ML
40 INJECTION, SUSPENSION INTRA-ARTICULAR; INTRALESIONAL; INTRAMUSCULAR; SOFT TISSUE ONCE
Status: COMPLETED | OUTPATIENT
Start: 2021-04-08 | End: 2021-04-08

## 2021-04-08 RX ORDER — HYDROCODONE BITARTRATE AND ACETAMINOPHEN 7.5; 325 MG/1; MG/1
2 TABLET ORAL EVERY 8 HOURS PRN
Qty: 180 TABLET | Refills: 0 | Status: SHIPPED | OUTPATIENT
Start: 2021-06-07 | End: 2021-07-07

## 2021-04-08 RX ADMIN — METHYLPREDNISOLONE ACETATE 40 MG: 40 INJECTION, SUSPENSION INTRA-ARTICULAR; INTRALESIONAL; INTRAMUSCULAR; SOFT TISSUE at 12:17:00

## 2021-04-08 NOTE — PROCEDURES
20610 x 2  Injection of the right shoulder. Injection of right knee. Diagnosis right shoulder osteoarthritis. Diagnosis primary osteoarthritis right knee.   After obtaining consent from the patient the right shoulder and right knee were both cleansed wit

## 2021-04-08 NOTE — PROGRESS NOTES
[facility]  Dr. Yuki Carmen Progress Note     Subjective:   Bry Jones is a(n) 68year old female. Current complaints: Patient presents with:   Follow - Up: LOV 1-7-21 virtual; c/o more joint pain, Left knee more discomfort than usual, right knee is right knee were injected with cortisone. Apply ice later today if needed for pain. Use Norco 10 mg 2 tablets twice a day to day to 3 times a day as needed for pain relief.   Take methotrexate 6 tablets/week which equals 15 mg a day for chronic arthritis t

## 2021-04-08 NOTE — PATIENT INSTRUCTIONS
Today both the right shoulder and the right knee were injected with cortisone. Apply ice later today if needed for pain. Use Norco 10 mg 2 tablets twice a day to day to 3 times a day as needed for pain relief.   Take methotrexate 6 tablets/week which [facility]

## 2021-06-08 DIAGNOSIS — M79.7 FIBROMYALGIA: ICD-10-CM

## 2021-06-08 DIAGNOSIS — M05.79 SEROPOSITIVE RHEUMATOID ARTHRITIS OF MULTIPLE SITES (HCC): ICD-10-CM

## 2021-06-09 RX ORDER — OMEPRAZOLE 40 MG/1
CAPSULE, DELAYED RELEASE ORAL
Qty: 60 CAPSULE | Refills: 0 | Status: SHIPPED | OUTPATIENT
Start: 2021-06-09 | End: 2021-07-05

## 2021-06-09 NOTE — TELEPHONE ENCOUNTER
Future Appointments   Date Time Provider Savi Adames   7/75/2230 23:93 AM Geovanna iSms MD [facility] Laila Londono

## 2021-07-03 DIAGNOSIS — M05.79 SEROPOSITIVE RHEUMATOID ARTHRITIS OF MULTIPLE SITES (HCC): ICD-10-CM

## 2021-07-03 DIAGNOSIS — M79.7 FIBROMYALGIA: ICD-10-CM

## 2021-07-05 RX ORDER — OMEPRAZOLE 40 MG/1
CAPSULE, DELAYED RELEASE ORAL
Qty: 60 CAPSULE | Refills: 0 | Status: SHIPPED | OUTPATIENT
Start: 2021-07-05 | End: 2021-10-04

## 2021-07-05 NOTE — TELEPHONE ENCOUNTER
Future Appointments   Date Time Provider Savi Adames   4/76/5633 30:68 AM Zoey Angel MD [facility] Rafi Velez

## 2021-07-31 DIAGNOSIS — M15.9 PRIMARY OSTEOARTHRITIS INVOLVING MULTIPLE JOINTS: ICD-10-CM

## 2021-07-31 DIAGNOSIS — M79.7 FIBROMYALGIA: ICD-10-CM

## 2021-07-31 DIAGNOSIS — M47.816 PRIMARY OSTEOARTHRITIS OF LUMBAR SPINE: ICD-10-CM

## 2021-07-31 DIAGNOSIS — Z98.1 S/P LUMBAR FUSION: ICD-10-CM

## 2021-07-31 DIAGNOSIS — E66.01 CLASS 3 SEVERE OBESITY DUE TO EXCESS CALORIES WITHOUT SERIOUS COMORBIDITY WITH BODY MASS INDEX (BMI) OF 45.0 TO 49.9 IN ADULT (HCC): ICD-10-CM

## 2021-07-31 DIAGNOSIS — M05.79 SEROPOSITIVE RHEUMATOID ARTHRITIS OF MULTIPLE SITES (HCC): ICD-10-CM

## 2021-08-02 ENCOUNTER — TELEPHONE (OUTPATIENT)
Dept: RHEUMATOLOGY | Facility: CLINIC | Age: 81
End: 2021-08-02

## 2021-08-02 RX ORDER — HYDROCODONE BITARTRATE AND ACETAMINOPHEN 7.5; 325 MG/1; MG/1
TABLET ORAL
Qty: 60 TABLET | Refills: 0 | Status: SHIPPED | OUTPATIENT
Start: 2021-08-02 | End: 2021-08-12

## 2021-08-02 NOTE — TELEPHONE ENCOUNTER
LOV 4-8-21  Future Appointments   Date Time Provider Savi Adames   1/70/9165 97:24 AM Kenna Wyman MD [facility] Michelle Mary Kate     Last dispensed per [facility] on 6-29-21 for #180 for 30 day supply.   No other scripts on file for this medication,

## 2021-08-12 ENCOUNTER — VIRTUAL PHONE E/M (OUTPATIENT)
Dept: RHEUMATOLOGY | Facility: CLINIC | Age: 81
End: 2021-08-12
Payer: MEDICARE

## 2021-08-12 DIAGNOSIS — M79.7 FIBROMYALGIA: ICD-10-CM

## 2021-08-12 DIAGNOSIS — M05.79 SEROPOSITIVE RHEUMATOID ARTHRITIS OF MULTIPLE SITES (HCC): ICD-10-CM

## 2021-08-12 DIAGNOSIS — Z98.1 S/P LUMBAR FUSION: ICD-10-CM

## 2021-08-12 DIAGNOSIS — M47.816 PRIMARY OSTEOARTHRITIS OF LUMBAR SPINE: ICD-10-CM

## 2021-08-12 DIAGNOSIS — E66.01 CLASS 3 SEVERE OBESITY DUE TO EXCESS CALORIES WITHOUT SERIOUS COMORBIDITY WITH BODY MASS INDEX (BMI) OF 45.0 TO 49.9 IN ADULT (HCC): ICD-10-CM

## 2021-08-12 DIAGNOSIS — M15.9 PRIMARY OSTEOARTHRITIS INVOLVING MULTIPLE JOINTS: ICD-10-CM

## 2021-08-12 PROCEDURE — 99442 PHONE E/M BY PHYS 11-20 MIN: CPT | Performed by: INTERNAL MEDICINE

## 2021-08-12 RX ORDER — HYDROCODONE BITARTRATE AND ACETAMINOPHEN 7.5; 325 MG/1; MG/1
2 TABLET ORAL EVERY 8 HOURS PRN
Qty: 180 TABLET | Refills: 0 | Status: SHIPPED | OUTPATIENT
Start: 2021-09-11 | End: 2021-10-11

## 2021-08-12 RX ORDER — HYDROCODONE BITARTRATE AND ACETAMINOPHEN 7.5; 325 MG/1; MG/1
2 TABLET ORAL EVERY 8 HOURS PRN
Qty: 180 TABLET | Refills: 0 | Status: SHIPPED | OUTPATIENT
Start: 2021-08-12 | End: 2021-09-11

## 2021-08-12 RX ORDER — ERGOCALCIFEROL 1.25 MG/1
50000 CAPSULE ORAL WEEKLY
Qty: 12 CAPSULE | Refills: 3 | Status: SHIPPED | OUTPATIENT
Start: 2021-08-12

## 2021-08-12 RX ORDER — HYDROCODONE BITARTRATE AND ACETAMINOPHEN 7.5; 325 MG/1; MG/1
2 TABLET ORAL EVERY 8 HOURS PRN
Qty: 180 TABLET | Refills: 0 | Status: SHIPPED | OUTPATIENT
Start: 2021-10-11 | End: 2021-11-10

## 2021-08-12 RX ORDER — GABAPENTIN 300 MG/1
300 CAPSULE ORAL 3 TIMES DAILY
Qty: 90 CAPSULE | Refills: 5 | Status: SHIPPED | OUTPATIENT
Start: 2021-08-12

## 2021-08-12 RX ORDER — FOLIC ACID 1 MG/1
1 TABLET ORAL DAILY
Qty: 90 TABLET | Refills: 3 | Status: SHIPPED | OUTPATIENT
Start: 2021-08-12

## 2021-08-12 NOTE — PATIENT INSTRUCTIONS
On methotrexate 6 tablets/week which equals 15 mg/week for treatment of rheumatoid arthritis. Take the vitamin folic acid 1 mg a day to prevent side effects from methotrexate. Gabapentin 800 mg 3 times a day for chronic pain syndrome.   Gabapentin also tr

## 2021-08-12 NOTE — PROGRESS NOTES
Telephone appointment - audio only  Office visit canceled due to coronavirus pandemic  [place] consents to a virtual/telephone check in service on 8/12/21  Patient understands and accepts financial responsibility for any deductible, coinsurance and/ mg a day to prevent side effects from methotrexate. Gabapentin 800 mg 3 times a day for chronic pain syndrome. Gabapentin also treats fibromyalgia pain. For severe breakthrough pain take Norco 7.5 mg 2 tablets 3 times per day.   Furosemide 20 mg once a d

## 2021-09-08 ENCOUNTER — TELEPHONE (OUTPATIENT)
Dept: RHEUMATOLOGY | Facility: CLINIC | Age: 81
End: 2021-09-08

## 2021-10-03 DIAGNOSIS — M79.7 FIBROMYALGIA: ICD-10-CM

## 2021-10-03 DIAGNOSIS — M05.79 SEROPOSITIVE RHEUMATOID ARTHRITIS OF MULTIPLE SITES (HCC): ICD-10-CM

## 2021-10-04 RX ORDER — OMEPRAZOLE 40 MG/1
CAPSULE, DELAYED RELEASE ORAL
Qty: 60 CAPSULE | Refills: 0 | Status: SHIPPED | OUTPATIENT
Start: 2021-10-04 | End: 2021-11-26

## 2021-10-04 NOTE — TELEPHONE ENCOUNTER
LOV virtual 8-12-21 (RTC 3 months)  LOV 4-8-21  Future Appointments   Date Time Provider Savi Adames   67/17/8377 42:44 AM Robbin Stephenson MD [facility] Donald Victoria

## 2021-11-23 DIAGNOSIS — M15.9 PRIMARY OSTEOARTHRITIS INVOLVING MULTIPLE JOINTS: ICD-10-CM

## 2021-11-23 DIAGNOSIS — Z98.1 S/P LUMBAR FUSION: ICD-10-CM

## 2021-11-23 DIAGNOSIS — M47.816 PRIMARY OSTEOARTHRITIS OF LUMBAR SPINE: ICD-10-CM

## 2021-11-23 DIAGNOSIS — M05.79 SEROPOSITIVE RHEUMATOID ARTHRITIS OF MULTIPLE SITES (HCC): ICD-10-CM

## 2021-11-23 DIAGNOSIS — E66.01 CLASS 3 SEVERE OBESITY DUE TO EXCESS CALORIES WITHOUT SERIOUS COMORBIDITY WITH BODY MASS INDEX (BMI) OF 45.0 TO 49.9 IN ADULT (HCC): ICD-10-CM

## 2021-11-23 DIAGNOSIS — M79.7 FIBROMYALGIA: ICD-10-CM

## 2021-11-24 RX ORDER — DIAZEPAM 5 MG/1
TABLET ORAL
Qty: 100 TABLET | Refills: 0 | Status: SHIPPED | OUTPATIENT
Start: 2021-11-24 | End: 2021-11-30

## 2021-11-24 NOTE — TELEPHONE ENCOUNTER
LOV virtual 8-12-21  LOV in clinic 4-8-21  Future Appointments   Date Time Provider Savi Adames   01/93/0524 88:39 AM Faisal Sanchez MD [facility] Tiff Phi

## 2021-11-25 DIAGNOSIS — M05.79 SEROPOSITIVE RHEUMATOID ARTHRITIS OF MULTIPLE SITES (HCC): ICD-10-CM

## 2021-11-25 DIAGNOSIS — M79.7 FIBROMYALGIA: ICD-10-CM

## 2021-11-26 RX ORDER — OMEPRAZOLE 40 MG/1
CAPSULE, DELAYED RELEASE ORAL
Qty: 60 CAPSULE | Refills: 0 | Status: SHIPPED | OUTPATIENT
Start: 2021-11-26

## 2021-11-26 NOTE — TELEPHONE ENCOUNTER
LOV 8-12-21 virtual  Future Appointments   Date Time Provider Savi Adames   82/31/8149 43:63 AM Martin Allison MD [facility] Arlene Church

## 2021-11-30 ENCOUNTER — OFFICE VISIT (OUTPATIENT)
Dept: RHEUMATOLOGY | Facility: CLINIC | Age: 81
End: 2021-11-30
Payer: MEDICARE

## 2021-11-30 VITALS
OXYGEN SATURATION: 100 % | BODY MASS INDEX: 46.95 KG/M2 | HEIGHT: 64 IN | RESPIRATION RATE: 18 BRPM | HEART RATE: 68 BPM | WEIGHT: 275 LBS

## 2021-11-30 DIAGNOSIS — M15.9 PRIMARY OSTEOARTHRITIS INVOLVING MULTIPLE JOINTS: ICD-10-CM

## 2021-11-30 DIAGNOSIS — Z98.1 S/P LUMBAR FUSION: ICD-10-CM

## 2021-11-30 DIAGNOSIS — E66.01 CLASS 3 SEVERE OBESITY DUE TO EXCESS CALORIES WITHOUT SERIOUS COMORBIDITY WITH BODY MASS INDEX (BMI) OF 45.0 TO 49.9 IN ADULT (HCC): ICD-10-CM

## 2021-11-30 DIAGNOSIS — M47.816 PRIMARY OSTEOARTHRITIS OF LUMBAR SPINE: ICD-10-CM

## 2021-11-30 DIAGNOSIS — I69.951 HEMIPARESIS OF RIGHT DOMINANT SIDE AS LATE EFFECT OF CEREBROVASCULAR DISEASE, UNSPECIFIED CEREBROVASCULAR DISEASE TYPE (HCC): ICD-10-CM

## 2021-11-30 DIAGNOSIS — F33.0 MILD EPISODE OF RECURRENT MAJOR DEPRESSIVE DISORDER (HCC): ICD-10-CM

## 2021-11-30 DIAGNOSIS — M79.7 FIBROMYALGIA: ICD-10-CM

## 2021-11-30 DIAGNOSIS — M05.79 SEROPOSITIVE RHEUMATOID ARTHRITIS OF MULTIPLE SITES (HCC): Primary | ICD-10-CM

## 2021-11-30 DIAGNOSIS — Z86.73 STATUS POST STROKE: ICD-10-CM

## 2021-11-30 PROBLEM — F33.9 RECURRENT MAJOR DEPRESSIVE DISORDER (HCC): Status: ACTIVE | Noted: 2021-11-30

## 2021-11-30 PROBLEM — M79.675 PAIN OF TOE OF LEFT FOOT: Status: RESOLVED | Noted: 2019-10-17 | Resolved: 2021-11-30

## 2021-11-30 PROBLEM — M79.604 PAIN IN RIGHT LEG: Status: RESOLVED | Noted: 2017-07-24 | Resolved: 2021-11-30

## 2021-11-30 PROCEDURE — 99214 OFFICE O/P EST MOD 30 MIN: CPT | Performed by: INTERNAL MEDICINE

## 2021-11-30 RX ORDER — HYDROCODONE BITARTRATE AND ACETAMINOPHEN 7.5; 325 MG/1; MG/1
2 TABLET ORAL EVERY 8 HOURS PRN
Qty: 180 TABLET | Refills: 0 | Status: SHIPPED | OUTPATIENT
Start: 2021-12-30 | End: 2022-01-29

## 2021-11-30 RX ORDER — FLUOXETINE HYDROCHLORIDE 20 MG/1
20 CAPSULE ORAL DAILY
Qty: 90 CAPSULE | Refills: 3 | Status: SHIPPED | OUTPATIENT
Start: 2021-11-30

## 2021-11-30 RX ORDER — DIAZEPAM 5 MG/1
TABLET ORAL
Qty: 100 TABLET | Refills: 2 | Status: SHIPPED | OUTPATIENT
Start: 2021-11-30

## 2021-11-30 RX ORDER — AMOXICILLIN 250 MG
1 CAPSULE ORAL 2 TIMES DAILY
COMMUNITY
Start: 2020-07-31

## 2021-11-30 RX ORDER — HYDROCODONE BITARTRATE AND ACETAMINOPHEN 7.5; 325 MG/1; MG/1
2 TABLET ORAL EVERY 8 HOURS PRN
Qty: 180 TABLET | Refills: 0 | Status: SHIPPED | OUTPATIENT
Start: 2021-11-30 | End: 2021-12-30

## 2021-11-30 RX ORDER — HYDROCODONE BITARTRATE AND ACETAMINOPHEN 7.5; 325 MG/1; MG/1
2 TABLET ORAL EVERY 8 HOURS PRN
Qty: 180 TABLET | Refills: 0 | Status: SHIPPED | OUTPATIENT
Start: 2022-01-29 | End: 2022-02-28

## 2021-11-30 RX ORDER — RIVAROXABAN 20 MG/1
20 TABLET, FILM COATED ORAL DAILY
COMMUNITY
Start: 2021-11-08

## 2021-11-30 RX ORDER — TORSEMIDE 20 MG/1
20 TABLET ORAL
COMMUNITY
Start: 2021-10-14

## 2021-11-30 NOTE — PROGRESS NOTES
[facility]  Dr. Ganga Townsend Progress Note     Subjective:   Karyle Hof is a(n) 68year old female. Current complaints: Patient presents with:  Osteoarthritis: est pt- fu 3mo- pt here from ambulance transfer. pt had stroke one year ago.  pt bed ridden platelet count 691,745 sodium 139 potassium 4.0 chloride 98 BUN 14 creatinine 0.49 GFR greater than 90 calcium 8.5 glucose 121 total protein 5.8 albumin 3.0 ALT 6 alkaline phosphatase 68 AST 15 bilirubin 0.4 TSH 1.64 vitamin D level 31.  Anemia present with

## 2021-11-30 NOTE — PATIENT INSTRUCTIONS
Methotrexate 6 tablets a week per week which equals 15 mg used to treat rheumatoid arthritis. Folic acid 1 mg a day which is a vitamin helps prevent side effects from the methotrexate. Diazepam 5 mg one every 6 hours as needed for muscle spasm.   Norco fo

## 2021-12-30 ENCOUNTER — TELEPHONE (OUTPATIENT)
Dept: RHEUMATOLOGY | Facility: CLINIC | Age: 81
End: 2021-12-30

## 2022-02-24 ENCOUNTER — VIRTUAL PHONE E/M (OUTPATIENT)
Dept: RHEUMATOLOGY | Facility: CLINIC | Age: 82
End: 2022-02-24
Payer: MEDICARE

## 2022-02-24 VITALS — BODY MASS INDEX: 48 KG/M2 | WEIGHT: 279 LBS

## 2022-02-24 DIAGNOSIS — M15.9 PRIMARY OSTEOARTHRITIS INVOLVING MULTIPLE JOINTS: ICD-10-CM

## 2022-02-24 DIAGNOSIS — E66.01 CLASS 3 SEVERE OBESITY DUE TO EXCESS CALORIES WITHOUT SERIOUS COMORBIDITY WITH BODY MASS INDEX (BMI) OF 45.0 TO 49.9 IN ADULT (HCC): ICD-10-CM

## 2022-02-24 DIAGNOSIS — M79.7 FIBROMYALGIA: ICD-10-CM

## 2022-02-24 DIAGNOSIS — M05.79 SEROPOSITIVE RHEUMATOID ARTHRITIS OF MULTIPLE SITES (HCC): ICD-10-CM

## 2022-02-24 DIAGNOSIS — E55.9 VITAMIN D DEFICIENCY: Primary | ICD-10-CM

## 2022-02-24 DIAGNOSIS — Z98.1 S/P LUMBAR FUSION: ICD-10-CM

## 2022-02-24 DIAGNOSIS — M47.816 PRIMARY OSTEOARTHRITIS OF LUMBAR SPINE: ICD-10-CM

## 2022-02-24 PROCEDURE — 99443 PHONE E/M BY PHYS 21-30 MIN: CPT | Performed by: INTERNAL MEDICINE

## 2022-02-24 RX ORDER — HYDROCODONE BITARTRATE AND ACETAMINOPHEN 7.5; 325 MG/1; MG/1
2 TABLET ORAL EVERY 8 HOURS PRN
Qty: 180 TABLET | Refills: 0 | Status: SHIPPED | OUTPATIENT
Start: 2022-04-26 | End: 2022-05-26

## 2022-02-24 RX ORDER — HYDROCODONE BITARTRATE AND ACETAMINOPHEN 7.5; 325 MG/1; MG/1
2 TABLET ORAL EVERY 8 HOURS PRN
Qty: 180 TABLET | Refills: 0 | Status: SHIPPED | OUTPATIENT
Start: 2022-03-26 | End: 2022-04-25

## 2022-02-24 RX ORDER — SUCRALFATE 1 G/1
1 TABLET ORAL 2 TIMES DAILY
COMMUNITY
Start: 2020-08-06

## 2022-02-24 RX ORDER — GABAPENTIN 300 MG/1
300 CAPSULE ORAL 3 TIMES DAILY
Qty: 90 CAPSULE | Refills: 5 | Status: SHIPPED | OUTPATIENT
Start: 2022-02-24

## 2022-02-24 RX ORDER — FLUOXETINE HYDROCHLORIDE 20 MG/1
20 CAPSULE ORAL DAILY
Qty: 90 CAPSULE | Refills: 3 | Status: CANCELLED | OUTPATIENT
Start: 2022-02-24

## 2022-02-24 RX ORDER — HYDROCODONE BITARTRATE AND ACETAMINOPHEN 7.5; 325 MG/1; MG/1
2 TABLET ORAL EVERY 8 HOURS PRN
Qty: 180 TABLET | Refills: 0 | Status: SHIPPED | OUTPATIENT
Start: 2022-02-24 | End: 2022-03-26

## 2022-02-24 RX ORDER — DIAZEPAM 5 MG/1
TABLET ORAL
Qty: 100 TABLET | Refills: 2 | Status: SHIPPED | OUTPATIENT
Start: 2022-02-24

## 2022-02-24 RX ORDER — FOLIC ACID 1 MG/1
1 TABLET ORAL DAILY
Qty: 90 TABLET | Refills: 3 | Status: SHIPPED | OUTPATIENT
Start: 2022-02-24

## 2022-02-24 RX ORDER — ONDANSETRON 4 MG/1
4 TABLET, FILM COATED ORAL EVERY 12 HOURS PRN
Qty: 60 TABLET | Refills: 2 | Status: CANCELLED | OUTPATIENT
Start: 2022-02-24

## 2022-02-24 NOTE — PATIENT INSTRUCTIONS
Continue methotrexate 6 tablets/week which equals 15 mg a week for treatment of rheumatoid arthritis. Folic acid is taken 1 mg a day to prevent side effects from the methotrexate. Gabapentin is used 300 mg 3 times a day to help reduce chronic pain. Diazepam is taken 5 mg every 6 hours as needed for muscle spasms. Martinez Charles is taken for pain relief 7.5 mg 2 tablets 3 times per day. Lab tests are ordered to be done to check for side effects from methotrexate and check the status of the rheumatoid arthritis.

## 2022-03-21 RX ORDER — OMEPRAZOLE 40 MG/1
CAPSULE, DELAYED RELEASE ORAL
Qty: 60 CAPSULE | Refills: 0 | Status: SHIPPED | OUTPATIENT
Start: 2022-03-21

## 2022-03-21 NOTE — TELEPHONE ENCOUNTER
LOV 2-24-22 virtual  Future Appointments   Date Time Provider Savi Rosangela   3/90/8999 08:31 PM Jennifer Troy MD [facility] Enrique Herring

## 2022-05-23 ENCOUNTER — TELEMEDICINE (OUTPATIENT)
Dept: RHEUMATOLOGY | Facility: CLINIC | Age: 82
End: 2022-05-23
Payer: MEDICARE

## 2022-05-23 DIAGNOSIS — M05.79 SEROPOSITIVE RHEUMATOID ARTHRITIS OF MULTIPLE SITES (HCC): ICD-10-CM

## 2022-05-23 DIAGNOSIS — M15.9 PRIMARY OSTEOARTHRITIS INVOLVING MULTIPLE JOINTS: Primary | ICD-10-CM

## 2022-05-23 DIAGNOSIS — M79.7 FIBROMYALGIA: ICD-10-CM

## 2022-05-23 DIAGNOSIS — Z86.73 STATUS POST STROKE: ICD-10-CM

## 2022-05-23 DIAGNOSIS — Z98.1 S/P LUMBAR FUSION: ICD-10-CM

## 2022-05-23 DIAGNOSIS — I69.951 HEMIPARESIS OF RIGHT DOMINANT SIDE AS LATE EFFECT OF CEREBROVASCULAR DISEASE, UNSPECIFIED CEREBROVASCULAR DISEASE TYPE (HCC): ICD-10-CM

## 2022-05-23 PROCEDURE — 99213 OFFICE O/P EST LOW 20 MIN: CPT | Performed by: INTERNAL MEDICINE

## 2022-05-23 RX ORDER — HYDROCODONE BITARTRATE AND ACETAMINOPHEN 7.5; 325 MG/1; MG/1
2 TABLET ORAL EVERY 8 HOURS PRN
Qty: 180 TABLET | Refills: 0 | Status: SHIPPED | OUTPATIENT
Start: 2022-06-26 | End: 2022-07-26

## 2022-05-23 RX ORDER — HYDROCODONE BITARTRATE AND ACETAMINOPHEN 7.5; 325 MG/1; MG/1
2 TABLET ORAL EVERY 8 HOURS PRN
Qty: 180 TABLET | Refills: 0 | Status: SHIPPED | OUTPATIENT
Start: 2022-05-26 | End: 2022-06-25

## 2022-05-23 RX ORDER — HYDROCODONE BITARTRATE AND ACETAMINOPHEN 7.5; 325 MG/1; MG/1
2 TABLET ORAL EVERY 8 HOURS PRN
Qty: 180 TABLET | Refills: 0 | Status: SHIPPED | OUTPATIENT
Start: 2022-07-27 | End: 2022-08-26

## 2022-05-23 RX ORDER — POLYETHYLENE GLYCOL 3350 17 G/17G
17 POWDER, FOR SOLUTION ORAL DAILY
COMMUNITY
Start: 2021-12-27

## 2022-05-23 NOTE — PATIENT INSTRUCTIONS
Continue methotrexate 6 tablets/week which equals 15 mg/week for treatment of rheumatoid arthritis. Folic acid is 1 mg/day. Kaveh Mendosa had a stroke 2 years ago and is mainly bedridden and weak right side. Have the visiting doctor check you and do your labs. For your leg and ankle edema take your water pill daily. Have someone bend your legs back-and-forth especially her ankles at times to help mobilize circulation. Do not eat a lot of salt. Change her positions in bed at times. For breakthrough pain take your Norco 1 or 2 tablets 3 times a day up to 6 a day if needed. 7.5 mg dose. Call for another video visit in 3 months.

## 2022-05-23 NOTE — PROGRESS NOTES
This visit is conducted using Telemedicine with live, interactive video and audio. Patient has been referred to the [facility] website at [email or web address] to review the yearly Consent to Treat document. Patient understands and accepts financial responsibility for any deductible, co-insurance and/or co-pays associated with this service. [facility]  Dr. Kim Gene Progress Note  Subjective:   Katya Joy is a(n) 80year old female. Current complaints: Patient presents with:  Rheumatoid Arthritis: previous phone visit. Has been having more joint pain recently. Noticed that when the weather is bad, the pain is worse. Ankles have been swollen, foot has been better. Right elbow and right shoulder pain increased. Uses voltaren gel that helps temporarily. Does have some itching that comes and goes on legs. Not sure of the cause  [place]ron[place] pain patient due to osteoarthritis and rheumatoid arthritis. Takes Norco regularly. [place] on oxygen, laying in bed today. Sore in the ankles , ankles swell. On a water pill daily. Having pain right elbow and right shoulder. No swelling in right shoulder. Occasonally hands swell. Ankles swell. No strength on the right side. Stroke occurred 2 years ago. Objective:   Visit lasted 20 minutes in total.  Assessment:   Seropositive rheumatoid arthritis of multiple sites (hcc)  Fibromyalgia  Primary osteoarthritis involving multiple joints  (primary encounter diagnosis)  Status post stroke  Hemiparesis of right dominant side as late effect of cerebrovascular disease, unspecified cerebrovascular disease type (hcc)  S/p lumbar fusion  Bed ridden due to past stroke that which occurred 2 years ago. Plan:   Patient Instructions   Continue methotrexate 6 tablets/week which equals 15 mg/week for treatment of rheumatoid arthritis. Folic acid is 1 mg/day. Adolfo Brink had a stroke 2 years ago and is mainly bedridden and weak right side.   Have the visiting doctor check you and do your labs. For your leg and ankle edema take your water pill daily. Have someone bend your legs back-and-forth especially her ankles at times to help mobilize circulation. Do not eat a lot of salt. Change her positions in bed at times. For breakthrough pain take your Norco 1 or 2 tablets 3 times a day up to 6 a day if needed. 7.5 mg dose. Call for another video visit in 3 months.       Roma Lopez MD 0/16/3655 12:06 PM

## 2022-09-06 RX ORDER — HYDROCODONE BITARTRATE AND ACETAMINOPHEN 7.5; 325 MG/1; MG/1
TABLET ORAL
Qty: 180 TABLET | Refills: 0 | Status: SHIPPED | OUTPATIENT
Start: 2022-09-06

## 2022-09-13 ENCOUNTER — OFFICE VISIT (OUTPATIENT)
Dept: RHEUMATOLOGY | Facility: CLINIC | Age: 82
End: 2022-09-13
Payer: MEDICARE

## 2022-09-13 VITALS — TEMPERATURE: 98 F | HEART RATE: 75 BPM | BODY MASS INDEX: 48 KG/M2 | OXYGEN SATURATION: 77 % | HEIGHT: 64 IN

## 2022-09-13 DIAGNOSIS — M47.816 PRIMARY OSTEOARTHRITIS OF LUMBAR SPINE: ICD-10-CM

## 2022-09-13 DIAGNOSIS — Z98.1 S/P LUMBAR FUSION: ICD-10-CM

## 2022-09-13 DIAGNOSIS — Z86.73 STATUS POST STROKE: ICD-10-CM

## 2022-09-13 DIAGNOSIS — M15.9 PRIMARY OSTEOARTHRITIS INVOLVING MULTIPLE JOINTS: ICD-10-CM

## 2022-09-13 DIAGNOSIS — M05.79 SEROPOSITIVE RHEUMATOID ARTHRITIS OF MULTIPLE SITES (HCC): Primary | ICD-10-CM

## 2022-09-13 DIAGNOSIS — M79.7 FIBROMYALGIA: ICD-10-CM

## 2022-09-13 DIAGNOSIS — I69.951 HEMIPARESIS OF RIGHT DOMINANT SIDE AS LATE EFFECT OF CEREBROVASCULAR DISEASE, UNSPECIFIED CEREBROVASCULAR DISEASE TYPE (HCC): ICD-10-CM

## 2022-09-13 DIAGNOSIS — E66.01 CLASS 3 SEVERE OBESITY DUE TO EXCESS CALORIES WITHOUT SERIOUS COMORBIDITY WITH BODY MASS INDEX (BMI) OF 45.0 TO 49.9 IN ADULT (HCC): ICD-10-CM

## 2022-09-13 PROCEDURE — 99214 OFFICE O/P EST MOD 30 MIN: CPT | Performed by: INTERNAL MEDICINE

## 2022-09-13 RX ORDER — GABAPENTIN 300 MG/1
300 CAPSULE ORAL 3 TIMES DAILY
Qty: 270 CAPSULE | Refills: 3 | Status: SHIPPED | OUTPATIENT
Start: 2022-09-13

## 2022-09-13 RX ORDER — HYDROCODONE BITARTRATE AND ACETAMINOPHEN 7.5; 325 MG/1; MG/1
2 TABLET ORAL EVERY 8 HOURS PRN
Qty: 180 TABLET | Refills: 0 | Status: SHIPPED | OUTPATIENT
Start: 2022-11-07 | End: 2022-12-07

## 2022-09-13 RX ORDER — FOLIC ACID 1 MG/1
1 TABLET ORAL DAILY
Qty: 90 TABLET | Refills: 3 | Status: CANCELLED
Start: 2022-09-13

## 2022-09-13 RX ORDER — HYDROCODONE BITARTRATE AND ACETAMINOPHEN 7.5; 325 MG/1; MG/1
2 TABLET ORAL EVERY 8 HOURS PRN
Qty: 180 TABLET | Refills: 0 | Status: SHIPPED | OUTPATIENT
Start: 2022-12-07 | End: 2023-01-06

## 2022-09-13 RX ORDER — FLUOXETINE HYDROCHLORIDE 20 MG/1
20 CAPSULE ORAL DAILY
Qty: 90 CAPSULE | Refills: 3 | Status: SHIPPED | OUTPATIENT
Start: 2022-09-13

## 2022-09-13 RX ORDER — LIDOCAINE 40 MG/G
4 CREAM TOPICAL 4 TIMES DAILY PRN
Qty: 120 G | Refills: 2 | Status: SHIPPED | OUTPATIENT
Start: 2022-09-13

## 2022-09-13 RX ORDER — ONDANSETRON 4 MG/1
4 TABLET, FILM COATED ORAL EVERY 12 HOURS PRN
Qty: 60 TABLET | Refills: 2 | Status: SHIPPED | OUTPATIENT
Start: 2022-09-13

## 2022-09-13 RX ORDER — ERGOCALCIFEROL 1.25 MG/1
50000 CAPSULE ORAL WEEKLY
Qty: 13 CAPSULE | Refills: 3 | Status: SHIPPED | OUTPATIENT
Start: 2022-09-13

## 2022-09-13 RX ORDER — HYDROCODONE BITARTRATE AND ACETAMINOPHEN 7.5; 325 MG/1; MG/1
2 TABLET ORAL EVERY 8 HOURS PRN
Qty: 180 TABLET | Refills: 0 | Status: SHIPPED | OUTPATIENT
Start: 2022-10-07 | End: 2022-11-06

## 2022-09-13 RX ORDER — OMEPRAZOLE 40 MG/1
40 CAPSULE, DELAYED RELEASE ORAL 2 TIMES DAILY
Qty: 60 CAPSULE | Refills: 0 | Status: CANCELLED
Start: 2022-09-13

## 2022-09-13 RX ORDER — DIAZEPAM 5 MG/1
TABLET ORAL
Qty: 100 TABLET | Refills: 2 | Status: SHIPPED | OUTPATIENT
Start: 2022-09-13

## 2022-09-13 NOTE — PATIENT INSTRUCTIONS
Increase Methotrexate to 7 tablets per week for Rheumatoid Arthritis. Folic acid 1 mg per day. Norco for pain 7.5 mg use 2 tablets three times a day as needed. Dr Almas Joe visiting doctor to do lab tests every 3 months. Diazepam one tablet at night for sleep. Diclofenac gl apply 3 times per day as needed. Gabapentin 300 mg three tines per day. Fluoxetine 20 mg per day. Vitamin D once per week. Elevate the legs as best you can. You retain fluid due to poor circulation from your stroke. Next visit is by phone/video. In 3 1/2 months.

## 2022-09-14 ENCOUNTER — TELEPHONE (OUTPATIENT)
Dept: RHEUMATOLOGY | Facility: CLINIC | Age: 82
End: 2022-09-14

## 2022-09-20 ENCOUNTER — TELEPHONE (OUTPATIENT)
Dept: RHEUMATOLOGY | Facility: CLINIC | Age: 82
End: 2022-09-20

## 2022-09-20 NOTE — TELEPHONE ENCOUNTER
Letter sent to Dr. Sruthi Padron [address] [facility] [address] [facility] Aroldo Medrano phone number [phone] fax number [phone]. Dr. Maricel Bailey does home care visits. On [facility]. 7/14/2022 creatinine was 0.53 hemoglobin 7.5.  4/5/2022 creatinine was 0.47 glucose 111 sodium 139 potassium 3.8 total protein 6.6 albumin 3.5 AST 14 ALT 5.  7/20 hemoglobin 8.4 hematocrit 29.3 MCV 99 platelet count 430,820   7/15/2022 hemoglobin A1c 4.7. Total cholesterol 135 HDL 53 LDL 66 triglyceride 81. TSH 2.35 Free T4 1.03 vitamin D 30 vitamin B12 637 iron 42 saturation 12% TIBC 419. Echo of the heart from 7/2021 showed an ejection fracture of 63%. Patient is active problem list is congestive heart failure with preserved ejection fraction. Secondary hypercoagulable state. Morbid obesity. Multiple thyroid nodules. GERD. Anemia of chronic disease. Depression. Chronic use of opioids. Chronic rheumatoid arthritis. Fibromyalgia. Past stroke. Hemiparesis right side. Acute on top of chronic renal respiratory failure. Sleep apnea hypersomnia obesity. Impaired mobility. Functional quadriplegic. Chronic pain syndrome. Dr. Maricel Bailey plans to continue home visits.

## 2022-12-21 ENCOUNTER — TELEMEDICINE (OUTPATIENT)
Dept: RHEUMATOLOGY | Facility: CLINIC | Age: 82
End: 2022-12-21
Payer: MEDICARE

## 2022-12-21 VITALS — BODY MASS INDEX: 48 KG/M2 | HEIGHT: 64 IN

## 2022-12-21 DIAGNOSIS — E66.01 CLASS 3 SEVERE OBESITY DUE TO EXCESS CALORIES WITHOUT SERIOUS COMORBIDITY WITH BODY MASS INDEX (BMI) OF 45.0 TO 49.9 IN ADULT (HCC): ICD-10-CM

## 2022-12-21 DIAGNOSIS — U07.1 COVID-19 VIRUS INFECTION: Primary | ICD-10-CM

## 2022-12-21 DIAGNOSIS — M79.7 FIBROMYALGIA: ICD-10-CM

## 2022-12-21 DIAGNOSIS — M15.9 PRIMARY OSTEOARTHRITIS INVOLVING MULTIPLE JOINTS: ICD-10-CM

## 2022-12-21 DIAGNOSIS — Z98.1 S/P LUMBAR FUSION: ICD-10-CM

## 2022-12-21 DIAGNOSIS — M05.79 SEROPOSITIVE RHEUMATOID ARTHRITIS OF MULTIPLE SITES (HCC): ICD-10-CM

## 2022-12-21 DIAGNOSIS — M47.816 PRIMARY OSTEOARTHRITIS OF LUMBAR SPINE: ICD-10-CM

## 2022-12-21 PROCEDURE — 99214 OFFICE O/P EST MOD 30 MIN: CPT | Performed by: INTERNAL MEDICINE

## 2022-12-21 RX ORDER — HYDROCODONE BITARTRATE AND ACETAMINOPHEN 7.5; 325 MG/1; MG/1
2 TABLET ORAL EVERY 8 HOURS PRN
Qty: 180 TABLET | Refills: 0 | Status: SHIPPED | OUTPATIENT
Start: 2023-01-20 | End: 2023-02-19

## 2022-12-21 RX ORDER — HYDROCODONE BITARTRATE AND ACETAMINOPHEN 7.5; 325 MG/1; MG/1
2 TABLET ORAL EVERY 8 HOURS PRN
Qty: 180 TABLET | Refills: 0 | Status: SHIPPED | OUTPATIENT
Start: 2022-12-21 | End: 2023-01-20

## 2022-12-21 RX ORDER — DIAZEPAM 5 MG/1
TABLET ORAL
Qty: 100 TABLET | Refills: 2 | Status: CANCELLED
Start: 2022-12-21

## 2022-12-21 RX ORDER — HYDROCODONE BITARTRATE AND ACETAMINOPHEN 7.5; 325 MG/1; MG/1
2 TABLET ORAL EVERY 8 HOURS PRN
Qty: 180 TABLET | Refills: 0 | Status: SHIPPED | OUTPATIENT
Start: 2023-02-20 | End: 2023-03-22

## 2022-12-21 NOTE — PATIENT INSTRUCTIONS
Shirley Gerardo developed COVID about 2 weeks ago. Because fever increased aches. At the tail end of COVID but still not feeling well. Discussed with daughter that there is no cure for COVID. Drink plenty of fluid. For fever take extra strength Tylenol, sometimes a supplement zinc 1 a day can help your immune system fight the COVID. For the chronic pain of her arthritis continue using the Norco 1 or 2 tablets 3 times a day. Diazepam 1 tablet daily as needed for muscle spasm. Get adequate rest obviously because of the illness. Return office for recheck in 3 months or call again for video visit.

## 2023-03-15 ENCOUNTER — TELEMEDICINE (OUTPATIENT)
Dept: RHEUMATOLOGY | Facility: CLINIC | Age: 83
End: 2023-03-15
Payer: MEDICARE

## 2023-03-15 VITALS
BODY MASS INDEX: 48 KG/M2 | HEART RATE: 59 BPM | SYSTOLIC BLOOD PRESSURE: 130 MMHG | HEIGHT: 64 IN | DIASTOLIC BLOOD PRESSURE: 62 MMHG

## 2023-03-15 DIAGNOSIS — I69.951 HEMIPARESIS OF RIGHT DOMINANT SIDE AS LATE EFFECT OF CEREBROVASCULAR DISEASE, UNSPECIFIED CEREBROVASCULAR DISEASE TYPE (HCC): Primary | ICD-10-CM

## 2023-03-15 DIAGNOSIS — Z86.73 STATUS POST STROKE: ICD-10-CM

## 2023-03-15 DIAGNOSIS — Z98.1 S/P LUMBAR FUSION: ICD-10-CM

## 2023-03-15 DIAGNOSIS — M05.79 SEROPOSITIVE RHEUMATOID ARTHRITIS OF MULTIPLE SITES (HCC): ICD-10-CM

## 2023-03-15 DIAGNOSIS — M79.7 FIBROMYALGIA: ICD-10-CM

## 2023-03-15 DIAGNOSIS — M47.816 PRIMARY OSTEOARTHRITIS OF LUMBAR SPINE: ICD-10-CM

## 2023-03-15 PROCEDURE — 99213 OFFICE O/P EST LOW 20 MIN: CPT | Performed by: INTERNAL MEDICINE

## 2023-03-15 RX ORDER — GABAPENTIN 300 MG/1
300 CAPSULE ORAL 3 TIMES DAILY
Qty: 270 CAPSULE | Refills: 3 | Status: SHIPPED | OUTPATIENT
Start: 2023-03-15

## 2023-03-15 RX ORDER — HYDROCODONE BITARTRATE AND ACETAMINOPHEN 7.5; 325 MG/1; MG/1
2 TABLET ORAL EVERY 8 HOURS PRN
Qty: 180 TABLET | Refills: 0 | Status: SHIPPED | OUTPATIENT
Start: 2023-04-14 | End: 2023-05-14

## 2023-03-15 RX ORDER — HYDROCODONE BITARTRATE AND ACETAMINOPHEN 7.5; 325 MG/1; MG/1
2 TABLET ORAL EVERY 8 HOURS PRN
Qty: 180 TABLET | Refills: 0 | Status: SHIPPED | OUTPATIENT
Start: 2023-03-15 | End: 2023-04-14

## 2023-03-15 RX ORDER — HYDROCODONE BITARTRATE AND ACETAMINOPHEN 7.5; 325 MG/1; MG/1
2 TABLET ORAL EVERY 8 HOURS PRN
Qty: 180 TABLET | Refills: 0 | Status: SHIPPED | OUTPATIENT
Start: 2023-05-14 | End: 2023-06-13

## 2023-03-15 RX ORDER — OMEPRAZOLE 40 MG/1
40 CAPSULE, DELAYED RELEASE ORAL 2 TIMES DAILY PRN
Qty: 60 CAPSULE | Refills: 2 | Status: SHIPPED | OUTPATIENT
Start: 2023-03-15

## 2023-03-15 NOTE — PATIENT INSTRUCTIONS
Norco for pain 2 tablets 3 times per day. 7.5 mg xiao. Neuropathy treatment Gabapent 300 mg three per day. Omeprazole 40 mg bid for GERD. Discuss with visiting doctor changing your diuretic. Return to office 3 months or do another video visit.

## 2023-05-31 ENCOUNTER — VIRTUAL PHONE E/M (OUTPATIENT)
Dept: RHEUMATOLOGY | Facility: CLINIC | Age: 83
End: 2023-05-31
Payer: MEDICARE

## 2023-05-31 VITALS — HEIGHT: 64 IN | BODY MASS INDEX: 48 KG/M2

## 2023-05-31 DIAGNOSIS — M15.9 PRIMARY OSTEOARTHRITIS INVOLVING MULTIPLE JOINTS: ICD-10-CM

## 2023-05-31 DIAGNOSIS — Z86.73 STATUS POST STROKE: ICD-10-CM

## 2023-05-31 DIAGNOSIS — G40.909 SEIZURE DISORDER (HCC): ICD-10-CM

## 2023-05-31 DIAGNOSIS — M79.7 FIBROMYALGIA: ICD-10-CM

## 2023-05-31 DIAGNOSIS — M05.79 SEROPOSITIVE RHEUMATOID ARTHRITIS OF MULTIPLE SITES (HCC): Primary | ICD-10-CM

## 2023-05-31 DIAGNOSIS — I69.951 HEMIPARESIS OF RIGHT DOMINANT SIDE AS LATE EFFECT OF CEREBROVASCULAR DISEASE, UNSPECIFIED CEREBROVASCULAR DISEASE TYPE (HCC): ICD-10-CM

## 2023-05-31 PROCEDURE — 99442 PHONE E/M BY PHYS 11-20 MIN: CPT | Performed by: INTERNAL MEDICINE

## 2023-05-31 RX ORDER — LEVETIRACETAM 500 MG/1
500 TABLET ORAL 2 TIMES DAILY
COMMUNITY

## 2023-05-31 RX ORDER — HYDROCODONE BITARTRATE AND ACETAMINOPHEN 7.5; 325 MG/1; MG/1
2 TABLET ORAL EVERY 8 HOURS PRN
Qty: 180 TABLET | Refills: 0 | Status: SHIPPED | OUTPATIENT
Start: 2023-08-22 | End: 2023-09-21

## 2023-05-31 RX ORDER — CEPHALEXIN 500 MG/1
500 CAPSULE ORAL 3 TIMES DAILY
COMMUNITY
Start: 2023-05-17

## 2023-05-31 RX ORDER — HYDROCODONE BITARTRATE AND ACETAMINOPHEN 7.5; 325 MG/1; MG/1
2 TABLET ORAL EVERY 8 HOURS PRN
Qty: 180 TABLET | Refills: 0 | Status: SHIPPED | OUTPATIENT
Start: 2023-07-23 | End: 2023-08-22

## 2023-05-31 RX ORDER — HYDROCODONE BITARTRATE AND ACETAMINOPHEN 7.5; 325 MG/1; MG/1
2 TABLET ORAL EVERY 8 HOURS PRN
Qty: 180 TABLET | Refills: 0 | Status: SHIPPED | OUTPATIENT
Start: 2023-06-23 | End: 2023-07-23

## 2023-05-31 NOTE — PATIENT INSTRUCTIONS
Now on Keppra 500 mg twice a day for seizures,  Norco 7.5 mg 2 tablets three times a day. Off diazepam due to Keppra. Gabapentin daily. RTO 3 months. Mid. September

## 2023-06-12 DIAGNOSIS — M15.9 PRIMARY OSTEOARTHRITIS INVOLVING MULTIPLE JOINTS: ICD-10-CM

## 2023-06-12 DIAGNOSIS — M79.7 FIBROMYALGIA: ICD-10-CM

## 2023-06-12 DIAGNOSIS — M47.816 PRIMARY OSTEOARTHRITIS OF LUMBAR SPINE: ICD-10-CM

## 2023-06-12 DIAGNOSIS — E66.01 CLASS 3 SEVERE OBESITY DUE TO EXCESS CALORIES WITHOUT SERIOUS COMORBIDITY WITH BODY MASS INDEX (BMI) OF 45.0 TO 49.9 IN ADULT (HCC): ICD-10-CM

## 2023-06-12 DIAGNOSIS — Z98.1 S/P LUMBAR FUSION: ICD-10-CM

## 2023-06-12 DIAGNOSIS — M05.79 SEROPOSITIVE RHEUMATOID ARTHRITIS OF MULTIPLE SITES (HCC): ICD-10-CM

## 2023-06-12 RX ORDER — FOLIC ACID 1 MG/1
TABLET ORAL
Qty: 90 TABLET | Refills: 0 | Status: SHIPPED | OUTPATIENT
Start: 2023-06-12

## 2023-06-12 NOTE — TELEPHONE ENCOUNTER
Future Appointments   Date Time Provider Savi Adames   2/0/2213  0:70 PM Shelton Bowser MD [facility] [place]     LOV  5/31/2023  Phone visit     Last refill  2/24/2022 90 tabs, 3 refills

## 2023-06-19 ENCOUNTER — TELEPHONE (OUTPATIENT)
Dept: RHEUMATOLOGY | Facility: CLINIC | Age: 83
End: 2023-06-19

## 2023-06-19 RX ORDER — WHEELCHAIR
EACH MISCELLANEOUS
Qty: 1 EACH | Refills: 0 | Status: SHIPPED | OUTPATIENT
Start: 2023-06-19

## 2023-06-28 ENCOUNTER — TELEPHONE (OUTPATIENT)
Dept: RHEUMATOLOGY | Facility: CLINIC | Age: 83
End: 2023-06-28

## 2023-09-18 NOTE — TELEPHONE ENCOUNTER
Future Appointments  Date Time Provider Savi Adames   20/53/1390 8:25 PM Nilesh Juárez MD [facility] [place] Patient's first and last name, , procedure, and correct site confirmed prior to the start of procedure.

## (undated) DIAGNOSIS — M79.7 FIBROMYALGIA: ICD-10-CM

## (undated) DIAGNOSIS — M05.79 SEROPOSITIVE RHEUMATOID ARTHRITIS OF MULTIPLE SITES (HCC): ICD-10-CM

## (undated) NOTE — MR AVS SNAPSHOT
[facility]  [address], [address]  [address] [place] [address] [phone]               Thank you for choosing us for your health care visit with Anshul Zuñiga MD.  We are glad to serve you and happy to provide you with this This list is accurate as of: 4/24/17 11:24 AM.  Always use your most recent med list.                Alpha-Lipoic Acid 200 MG Caps   Take 1 capsule by mouth daily.            ANUCORT-HC 25 MG Supp   Generic drug:  Hydrocortisone Acetate   Place 1 suppositor and this prescription was added. Make sure you understand how and when to take each. Commonly known as:  Stephanie Dillard   Start taking on:  6/5/2017           * HYDROcodone-acetaminophen 5-325 MG Tabs   2 tablets three times a day. What changed:   You were amelia If you have questions, you can call [phone] to talk to our [facility] Staff. Remember, [employer] is NOT to be used for urgent needs. For medical emergencies, dial 911. Visit [email or web address] to learn more.         Educational Infor

## (undated) NOTE — MR AVS SNAPSHOT
[facility]  [address], [address]  [address] [place] [address] [phone]               Thank you for choosing us for your health care visit with Noy Graff MD.  We are glad to serve you and happy to provide you with this Generic drug:  Hydrocortisone Acetate   Place 1 suppository rectally as needed. apixaban 5 MG Tabs   Take 5 mg by mouth 2 (two) times daily.    Commonly known as:  ELIQUIS           diazepam 5 MG Tabs   Take 1 tablet (5 mg total) by mouth every 4 - NEXIUM 40 MG Cpdr      You can get these medications from any pharmacy     Bring a paper prescription for each of these medications    - diazepam 5 MG Tabs  - HYDROcodone-acetaminophen 5-325 MG Tabs            Today's Orders     CATIA W Differential W Plat Moderation of alcohol consumption Men: limit to <= 2 drinks* per day. Women and lighter weight persons: limit to <= 1 drink* per day.          Healthy Diet and Regular Exercise  [employer] [address] [place] [address] for making healthy food choices  -   Enj